# Patient Record
Sex: FEMALE | Race: WHITE | NOT HISPANIC OR LATINO | Employment: UNEMPLOYED | URBAN - NONMETROPOLITAN AREA
[De-identification: names, ages, dates, MRNs, and addresses within clinical notes are randomized per-mention and may not be internally consistent; named-entity substitution may affect disease eponyms.]

---

## 2019-01-29 ENCOUNTER — CLINICAL SUPPORT (OUTPATIENT)
Dept: FAMILY MEDICINE CLINIC | Facility: CLINIC | Age: 49
End: 2019-01-29

## 2019-01-29 PROCEDURE — 90715 TDAP VACCINE 7 YRS/> IM: CPT | Performed by: FAMILY MEDICINE

## 2019-01-29 PROCEDURE — 90471 IMMUNIZATION ADMIN: CPT | Performed by: FAMILY MEDICINE

## 2019-11-07 ENCOUNTER — CLINICAL SUPPORT (OUTPATIENT)
Dept: FAMILY MEDICINE CLINIC | Facility: CLINIC | Age: 49
End: 2019-11-07

## 2019-11-07 DIAGNOSIS — J20.9 ACUTE BRONCHITIS, UNSPECIFIED ORGANISM: Primary | ICD-10-CM

## 2019-11-07 DIAGNOSIS — J40 BRONCHITIS: ICD-10-CM

## 2019-11-07 PROCEDURE — 96372 THER/PROPH/DIAG INJ SC/IM: CPT | Performed by: FAMILY MEDICINE

## 2019-11-07 RX ORDER — TRIAMCINOLONE ACETONIDE 40 MG/ML
80 INJECTION, SUSPENSION INTRA-ARTICULAR; INTRAMUSCULAR ONCE
Status: COMPLETED | OUTPATIENT
Start: 2019-11-07 | End: 2019-11-07

## 2019-11-07 RX ADMIN — TRIAMCINOLONE ACETONIDE 80 MG: 40 INJECTION, SUSPENSION INTRA-ARTICULAR; INTRAMUSCULAR at 11:29

## 2020-06-25 ENCOUNTER — TELEMEDICINE - AUDIO (OUTPATIENT)
Dept: FAMILY MEDICINE CLINIC | Facility: CLINIC | Age: 50
End: 2020-06-25

## 2020-06-25 DIAGNOSIS — G44.89 OTHER HEADACHE SYNDROME: ICD-10-CM

## 2020-06-25 DIAGNOSIS — R53.81 MALAISE: Primary | ICD-10-CM

## 2020-06-25 PROCEDURE — 99213 OFFICE O/P EST LOW 20 MIN: CPT | Performed by: FAMILY MEDICINE

## 2020-06-25 RX ORDER — IBUPROFEN 600 MG/1
600 TABLET ORAL EVERY 8 HOURS PRN
Qty: 90 TABLET | Refills: 0 | Status: SHIPPED | OUTPATIENT
Start: 2020-06-25 | End: 2020-06-29

## 2020-06-25 RX ORDER — BUTALBITAL, ACETAMINOPHEN AND CAFFEINE 50; 325; 40 MG/1; MG/1; MG/1
1 TABLET ORAL EVERY 4 HOURS PRN
Qty: 30 TABLET | Refills: 0 | Status: SHIPPED | OUTPATIENT
Start: 2020-06-25 | End: 2020-06-29

## 2020-06-25 RX ORDER — SULFAMETHOXAZOLE AND TRIMETHOPRIM 800; 160 MG/1; MG/1
1 TABLET ORAL 2 TIMES DAILY
Qty: 20 TABLET | Refills: 0 | Status: SHIPPED | OUTPATIENT
Start: 2020-06-25 | End: 2020-06-29

## 2020-06-25 NOTE — PROGRESS NOTES
Subjective   Sid Cm is a 50 y.o. female.     History of Present Illness     Video visit changed to phone visit per patient  Cc feels bad.    Sick since Tuesday.  Has been in bed since Tuesday.  Worst headache of her life.  Usually drinks lots of diet coke, changed when sick to gatorade.  She says headache started before switching from diet coke to gatorade.   Also  She thinks she has a urinary tract infection.  When she gets up to urinate, she cant make it to the bathroom in time, she will dribble.   She denies she is dehydrated.  She tried a muscle relaxer last night and no help.   Taking tylenol not helping.       Review of Systems   Constitutional: Positive for fatigue.   Genitourinary: Positive for urgency.   Neurological: Positive for headaches.           There were no vitals taken for this visit.      Objective     Physical Exam        PAST MEDICAL HISTORY     Past Medical History:   Diagnosis Date   • Bronchitis    • Carpal tunnel syndrome    • Forearm pain    • Joint pain of ankle and foot    • Leg pain    • Malaise and fatigue    • Voice hoarseness       PAST SURGICAL HISTORY   No past surgical history on file.   SOCIAL HISTORY     Social History     Socioeconomic History   • Marital status:      Spouse name: Not on file   • Number of children: Not on file   • Years of education: Not on file   • Highest education level: Not on file      ALLERGIES   Patient has no allergy information on record.   MEDICATIONS     Current Outpatient Medications   Medication Sig Dispense Refill   • butalbital-acetaminophen-caffeine (Esgic) -40 MG per tablet Take 1 tablet by mouth Every 4 (Four) Hours As Needed for Headache. Take infrequently, too often can cause headaches. 30 tablet 0   • ibuprofen (ADVIL,MOTRIN) 600 MG tablet Take 1 tablet by mouth Every 8 (Eight) Hours As Needed for Mild Pain . 90 tablet 0   • sulfamethoxazole-trimethoprim (BACTRIM DS,SEPTRA DS) 800-160 MG per tablet Take 1 tablet by  mouth 2 (Two) Times a Day. Avoid sun exposure. Drink plenty of water. 20 tablet 0   • triamcinolone (KENALOG) 0.5 % ointment APPLY TO ITCHY RASH BID-TID PRN       No current facility-administered medications for this visit.         The following portions of the patient's history were reviewed and updated as appropriate: allergies, current medications, past family history, past medical history, past social history, past surgical history and problem list.        Assessment/Plan   Diagnoses and all orders for this visit:    Malaise    Other headache syndrome  -     butalbital-acetaminophen-caffeine (Esgic) -40 MG per tablet; Take 1 tablet by mouth Every 4 (Four) Hours As Needed for Headache. Take infrequently, too often can cause headaches.    Other orders  -     sulfamethoxazole-trimethoprim (BACTRIM DS,SEPTRA DS) 800-160 MG per tablet; Take 1 tablet by mouth 2 (Two) Times a Day. Avoid sun exposure. Drink plenty of water.  -     ibuprofen (ADVIL,MOTRIN) 600 MG tablet; Take 1 tablet by mouth Every 8 (Eight) Hours As Needed for Mild Pain .      Suspect viral illness, which caused a headache.  Switching from diet coke to gatorade made her headache worse, caffeine withdrawal ha.  Also, drinking all the gatorade may be causing bladder spasms.  Will cover for uti with bactrim, I told her to drink more water than gatorade.                   No follow-ups on file.                  This document has been electronically signed by Gage Andrews MD on June 25, 2020 15:11

## 2020-06-29 ENCOUNTER — APPOINTMENT (OUTPATIENT)
Dept: ULTRASOUND IMAGING | Facility: HOSPITAL | Age: 50
End: 2020-06-29

## 2020-06-29 ENCOUNTER — APPOINTMENT (OUTPATIENT)
Dept: INTERVENTIONAL RADIOLOGY/VASCULAR | Facility: HOSPITAL | Age: 50
End: 2020-06-29

## 2020-06-29 ENCOUNTER — APPOINTMENT (OUTPATIENT)
Dept: CT IMAGING | Facility: HOSPITAL | Age: 50
End: 2020-06-29

## 2020-06-29 ENCOUNTER — APPOINTMENT (OUTPATIENT)
Dept: GENERAL RADIOLOGY | Facility: HOSPITAL | Age: 50
End: 2020-06-29

## 2020-06-29 ENCOUNTER — HOSPITAL ENCOUNTER (INPATIENT)
Facility: HOSPITAL | Age: 50
LOS: 2 days | Discharge: HOME | End: 2020-07-01
Attending: EMERGENCY MEDICINE | Admitting: INTERNAL MEDICINE

## 2020-06-29 DIAGNOSIS — R65.20 SEPSIS WITH ACUTE RENAL FAILURE WITHOUT SEPTIC SHOCK, DUE TO UNSPECIFIED ORGANISM, UNSPECIFIED ACUTE RENAL FAILURE TYPE (HCC): Primary | ICD-10-CM

## 2020-06-29 DIAGNOSIS — N17.9 SEPSIS WITH ACUTE RENAL FAILURE WITHOUT SEPTIC SHOCK, DUE TO UNSPECIFIED ORGANISM, UNSPECIFIED ACUTE RENAL FAILURE TYPE (HCC): Primary | ICD-10-CM

## 2020-06-29 DIAGNOSIS — E87.1 HYPONATREMIA: ICD-10-CM

## 2020-06-29 DIAGNOSIS — D69.6 THROMBOCYTOPENIA (HCC): ICD-10-CM

## 2020-06-29 DIAGNOSIS — R79.89 ABNORMAL LFTS: ICD-10-CM

## 2020-06-29 DIAGNOSIS — A41.9 SEPSIS WITH ACUTE RENAL FAILURE WITHOUT SEPTIC SHOCK, DUE TO UNSPECIFIED ORGANISM, UNSPECIFIED ACUTE RENAL FAILURE TYPE (HCC): Primary | ICD-10-CM

## 2020-06-29 DIAGNOSIS — D72.810 LYMPHOPENIA: ICD-10-CM

## 2020-06-29 LAB
ALBUMIN SERPL-MCNC: 3.1 G/DL (ref 3.5–5.2)
ALBUMIN/GLOB SERPL: 1 G/DL
ALP SERPL-CCNC: 212 U/L (ref 39–117)
ALT SERPL W P-5'-P-CCNC: 159 U/L (ref 1–33)
AMORPH URATE CRY URNS QL MICRO: ABNORMAL /HPF
ANION GAP SERPL CALCULATED.3IONS-SCNC: 16 MMOL/L (ref 5–15)
APTT PPP: 40.5 SECONDS (ref 20–40.3)
AST SERPL-CCNC: 299 U/L (ref 1–32)
BACTERIA UR QL AUTO: ABNORMAL /HPF
BILIRUB SERPL-MCNC: 1.6 MG/DL (ref 0.2–1.2)
BILIRUB UR QL STRIP: ABNORMAL
BUN SERPL-MCNC: 65 MG/DL (ref 6–20)
BUN/CREAT SERPL: 12.2 (ref 7–25)
CALCIUM SPEC-SCNC: 7.9 MG/DL (ref 8.6–10.5)
CHLORIDE SERPL-SCNC: 96 MMOL/L (ref 98–107)
CLARITY UR: ABNORMAL
CO2 SERPL-SCNC: 17 MMOL/L (ref 22–29)
COLOR UR: ABNORMAL
CREAT SERPL-MCNC: 5.32 MG/DL (ref 0.57–1)
D-LACTATE SERPL-SCNC: 1.1 MMOL/L (ref 0.5–2)
DACRYOCYTES BLD QL SMEAR: ABNORMAL
DEPRECATED RDW RBC AUTO: 43.6 FL (ref 37–54)
ERYTHROCYTE [DISTWIDTH] IN BLOOD BY AUTOMATED COUNT: 14.8 % (ref 12.3–15.4)
GFR SERPL CREATININE-BSD FRML MDRD: 9 ML/MIN/1.73
GFR SERPL CREATININE-BSD FRML MDRD: ABNORMAL ML/MIN/{1.73_M2}
GLOBULIN UR ELPH-MCNC: 3 GM/DL
GLUCOSE SERPL-MCNC: 88 MG/DL (ref 65–99)
GLUCOSE UR STRIP-MCNC: NEGATIVE MG/DL
HAV IGM SERPL QL IA: NORMAL
HBV CORE IGM SERPL QL IA: NORMAL
HBV SURFACE AG SERPL QL IA: NORMAL
HCG SERPL QL: NEGATIVE
HCT VFR BLD AUTO: 36.6 % (ref 34–46.6)
HCV AB SER DONR QL: NORMAL
HGB BLD-MCNC: 13.2 G/DL (ref 12–15.9)
HGB UR QL STRIP.AUTO: ABNORMAL
HOLD SPECIMEN: NORMAL
HOLD SPECIMEN: NORMAL
HYALINE CASTS UR QL AUTO: ABNORMAL /LPF
HYPOCHROMIA BLD QL: ABNORMAL
INR PPP: 0.96 (ref 0.8–1.2)
KETONES UR QL STRIP: NEGATIVE
LEUKOCYTE ESTERASE UR QL STRIP.AUTO: NEGATIVE
LIPASE SERPL-CCNC: 22 U/L (ref 13–60)
LYMPHOCYTES # BLD MANUAL: 0.27 10*3/MM3 (ref 0.7–3.1)
LYMPHOCYTES NFR BLD MANUAL: 6 % (ref 5–12)
LYMPHOCYTES NFR BLD MANUAL: 9 % (ref 19.6–45.3)
MCH RBC QN AUTO: 29.3 PG (ref 26.6–33)
MCHC RBC AUTO-ENTMCNC: 36.1 G/DL (ref 31.5–35.7)
MCV RBC AUTO: 81.2 FL (ref 79–97)
MONOCYTES # BLD AUTO: 0.18 10*3/MM3 (ref 0.1–0.9)
NEUTROPHILS # BLD AUTO: 2.53 10*3/MM3 (ref 1.7–7)
NEUTROPHILS NFR BLD MANUAL: 66 % (ref 42.7–76)
NEUTS BAND NFR BLD MANUAL: 17 % (ref 0–5)
NITRITE UR QL STRIP: NEGATIVE
PH UR STRIP.AUTO: <=5 [PH] (ref 5–9)
PLATELET # BLD AUTO: 50 10*3/MM3 (ref 140–450)
PMV BLD AUTO: 11.2 FL (ref 6–12)
POLYCHROMASIA BLD QL SMEAR: ABNORMAL
POTASSIUM SERPL-SCNC: 3.9 MMOL/L (ref 3.5–5.2)
PROT SERPL-MCNC: 6.1 G/DL (ref 6–8.5)
PROT UR QL STRIP: ABNORMAL
PROTHROMBIN TIME: 12.6 SECONDS (ref 11.1–15.3)
RBC # BLD AUTO: 4.51 10*6/MM3 (ref 3.77–5.28)
RBC # UR: ABNORMAL /HPF
REF LAB TEST METHOD: ABNORMAL
SARS-COV-2 N GENE RESP QL NAA+PROBE: NOT DETECTED
SMALL PLATELETS BLD QL SMEAR: ABNORMAL
SODIUM SERPL-SCNC: 129 MMOL/L (ref 136–145)
SP GR UR STRIP: 1.02 (ref 1–1.03)
SQUAMOUS #/AREA URNS HPF: ABNORMAL /HPF
UROBILINOGEN UR QL STRIP: ABNORMAL
VARIANT LYMPHS NFR BLD MANUAL: 2 % (ref 0–5)
WBC # BLD AUTO: 3.05 10*3/MM3 (ref 3.4–10.8)
WBC MORPH BLD: NORMAL
WBC UR QL AUTO: ABNORMAL /HPF
WHOLE BLOOD HOLD SPECIMEN: NORMAL
WHOLE BLOOD HOLD SPECIMEN: NORMAL

## 2020-06-29 PROCEDURE — 85610 PROTHROMBIN TIME: CPT | Performed by: EMERGENCY MEDICINE

## 2020-06-29 PROCEDURE — 87040 BLOOD CULTURE FOR BACTERIA: CPT | Performed by: INTERNAL MEDICINE

## 2020-06-29 PROCEDURE — 25010000002 PIPERACILLIN SOD-TAZOBACTAM PER 1 G: Performed by: EMERGENCY MEDICINE

## 2020-06-29 PROCEDURE — 76937 US GUIDE VASCULAR ACCESS: CPT

## 2020-06-29 PROCEDURE — 85007 BL SMEAR W/DIFF WBC COUNT: CPT | Performed by: EMERGENCY MEDICINE

## 2020-06-29 PROCEDURE — 36410 VNPNXR 3YR/> PHY/QHP DX/THER: CPT

## 2020-06-29 PROCEDURE — 80074 ACUTE HEPATITIS PANEL: CPT | Performed by: EMERGENCY MEDICINE

## 2020-06-29 PROCEDURE — 99285 EMERGENCY DEPT VISIT HI MDM: CPT

## 2020-06-29 PROCEDURE — 87150 DNA/RNA AMPLIFIED PROBE: CPT | Performed by: EMERGENCY MEDICINE

## 2020-06-29 PROCEDURE — 51702 INSERT TEMP BLADDER CATH: CPT

## 2020-06-29 PROCEDURE — 87635 SARS-COV-2 COVID-19 AMP PRB: CPT | Performed by: EMERGENCY MEDICINE

## 2020-06-29 PROCEDURE — 74176 CT ABD & PELVIS W/O CONTRAST: CPT

## 2020-06-29 PROCEDURE — 85025 COMPLETE CBC W/AUTO DIFF WBC: CPT | Performed by: EMERGENCY MEDICINE

## 2020-06-29 PROCEDURE — C1751 CATH, INF, PER/CENT/MIDLINE: HCPCS

## 2020-06-29 PROCEDURE — 86757 RICKETTSIA ANTIBODY: CPT | Performed by: EMERGENCY MEDICINE

## 2020-06-29 PROCEDURE — 70450 CT HEAD/BRAIN W/O DYE: CPT

## 2020-06-29 PROCEDURE — 05HY33Z INSERTION OF INFUSION DEVICE INTO UPPER VEIN, PERCUTANEOUS APPROACH: ICD-10-PCS | Performed by: INTERNAL MEDICINE

## 2020-06-29 PROCEDURE — 93010 ELECTROCARDIOGRAM REPORT: CPT | Performed by: INTERNAL MEDICINE

## 2020-06-29 PROCEDURE — 85730 THROMBOPLASTIN TIME PARTIAL: CPT | Performed by: EMERGENCY MEDICINE

## 2020-06-29 PROCEDURE — 86618 LYME DISEASE ANTIBODY: CPT | Performed by: EMERGENCY MEDICINE

## 2020-06-29 PROCEDURE — 85060 BLOOD SMEAR INTERPRETATION: CPT | Performed by: EMERGENCY MEDICINE

## 2020-06-29 PROCEDURE — 84703 CHORIONIC GONADOTROPIN ASSAY: CPT | Performed by: EMERGENCY MEDICINE

## 2020-06-29 PROCEDURE — 81001 URINALYSIS AUTO W/SCOPE: CPT | Performed by: EMERGENCY MEDICINE

## 2020-06-29 PROCEDURE — 80053 COMPREHEN METABOLIC PANEL: CPT

## 2020-06-29 PROCEDURE — 36415 COLL VENOUS BLD VENIPUNCTURE: CPT | Performed by: INTERNAL MEDICINE

## 2020-06-29 PROCEDURE — 86666 EHRLICHIA ANTIBODY: CPT | Performed by: EMERGENCY MEDICINE

## 2020-06-29 PROCEDURE — 93005 ELECTROCARDIOGRAM TRACING: CPT | Performed by: EMERGENCY MEDICINE

## 2020-06-29 PROCEDURE — 87086 URINE CULTURE/COLONY COUNT: CPT | Performed by: INTERNAL MEDICINE

## 2020-06-29 PROCEDURE — 87040 BLOOD CULTURE FOR BACTERIA: CPT | Performed by: EMERGENCY MEDICINE

## 2020-06-29 PROCEDURE — 86753 PROTOZOA ANTIBODY NOS: CPT | Performed by: EMERGENCY MEDICINE

## 2020-06-29 PROCEDURE — 83605 ASSAY OF LACTIC ACID: CPT | Performed by: EMERGENCY MEDICINE

## 2020-06-29 PROCEDURE — 25010000002 ONDANSETRON PER 1 MG: Performed by: EMERGENCY MEDICINE

## 2020-06-29 PROCEDURE — 83690 ASSAY OF LIPASE: CPT | Performed by: EMERGENCY MEDICINE

## 2020-06-29 PROCEDURE — 71045 X-RAY EXAM CHEST 1 VIEW: CPT

## 2020-06-29 RX ORDER — FOLIC ACID 1 MG/1
1 TABLET ORAL DAILY
COMMUNITY
End: 2022-08-12

## 2020-06-29 RX ORDER — DOXYCYCLINE 100 MG/1
100 CAPSULE ORAL 2 TIMES DAILY
Status: ON HOLD | COMMUNITY
Start: 2020-06-28 | End: 2020-06-29

## 2020-06-29 RX ORDER — ACETAMINOPHEN 325 MG/1
650 TABLET ORAL ONCE
Status: COMPLETED | OUTPATIENT
Start: 2020-06-29 | End: 2020-06-29

## 2020-06-29 RX ORDER — SODIUM CHLORIDE 0.9 % (FLUSH) 0.9 %
10 SYRINGE (ML) INJECTION EVERY 12 HOURS SCHEDULED
Status: DISCONTINUED | OUTPATIENT
Start: 2020-06-29 | End: 2020-07-01 | Stop reason: HOSPADM

## 2020-06-29 RX ORDER — SODIUM CHLORIDE 0.9 % (FLUSH) 0.9 %
10 SYRINGE (ML) INJECTION AS NEEDED
Status: DISCONTINUED | OUTPATIENT
Start: 2020-06-29 | End: 2020-07-01 | Stop reason: HOSPADM

## 2020-06-29 RX ORDER — SODIUM CHLORIDE 9 MG/ML
150 INJECTION, SOLUTION INTRAVENOUS CONTINUOUS
Status: DISCONTINUED | OUTPATIENT
Start: 2020-06-29 | End: 2020-06-30

## 2020-06-29 RX ORDER — ONDANSETRON 2 MG/ML
4 INJECTION INTRAMUSCULAR; INTRAVENOUS ONCE
Status: COMPLETED | OUTPATIENT
Start: 2020-06-29 | End: 2020-06-29

## 2020-06-29 RX ORDER — LEVOTHYROXINE SODIUM 88 UG/1
88 TABLET ORAL
Status: DISCONTINUED | OUTPATIENT
Start: 2020-06-30 | End: 2020-07-01 | Stop reason: HOSPADM

## 2020-06-29 RX ORDER — SULFASALAZINE 500 MG/1
500 TABLET ORAL 3 TIMES DAILY
COMMUNITY
End: 2020-07-01 | Stop reason: HOSPADM

## 2020-06-29 RX ORDER — SODIUM CHLORIDE 9 MG/ML
125 INJECTION, SOLUTION INTRAVENOUS CONTINUOUS
Status: DISCONTINUED | OUTPATIENT
Start: 2020-06-29 | End: 2020-06-29

## 2020-06-29 RX ORDER — LEVOTHYROXINE SODIUM 88 UG/1
1 TABLET ORAL
Status: ON HOLD | COMMUNITY
Start: 2020-04-06 | End: 2020-06-29

## 2020-06-29 RX ORDER — SULFASALAZINE 500 MG/1
500 TABLET ORAL 3 TIMES DAILY
Status: ON HOLD | COMMUNITY
Start: 2019-04-08 | End: 2020-06-29

## 2020-06-29 RX ORDER — ONDANSETRON 4 MG/1
4 TABLET, ORALLY DISINTEGRATING ORAL EVERY 8 HOURS PRN
Status: ON HOLD | COMMUNITY
Start: 2020-06-28 | End: 2020-06-29

## 2020-06-29 RX ORDER — PREDNISONE 1 MG/1
5 TABLET ORAL EVERY OTHER DAY
COMMUNITY
End: 2022-08-23

## 2020-06-29 RX ORDER — PREDNISONE 1 MG/1
5 TABLET ORAL EVERY OTHER DAY
Status: ON HOLD | COMMUNITY
Start: 2019-04-08 | End: 2020-06-29

## 2020-06-29 RX ORDER — LEVOTHYROXINE SODIUM 88 UG/1
88 TABLET ORAL DAILY
COMMUNITY

## 2020-06-29 RX ADMIN — ACETAMINOPHEN 650 MG: 325 TABLET, FILM COATED ORAL at 17:01

## 2020-06-29 RX ADMIN — SODIUM CHLORIDE 1641 ML: 900 INJECTION, SOLUTION INTRAVENOUS at 16:04

## 2020-06-29 RX ADMIN — SODIUM CHLORIDE 250 ML/HR: 900 INJECTION, SOLUTION INTRAVENOUS at 20:23

## 2020-06-29 RX ADMIN — ONDANSETRON 4 MG: 2 INJECTION INTRAMUSCULAR; INTRAVENOUS at 17:01

## 2020-06-29 RX ADMIN — Medication 10 ML: at 14:30

## 2020-06-29 RX ADMIN — VANCOMYCIN HYDROCHLORIDE 2500 MG: 1 INJECTION, POWDER, LYOPHILIZED, FOR SOLUTION INTRAVENOUS at 21:08

## 2020-06-30 LAB
ALBUMIN SERPL-MCNC: 2.6 G/DL (ref 3.5–5.2)
ALBUMIN/GLOB SERPL: 1 G/DL
ALP SERPL-CCNC: 185 U/L (ref 39–117)
ALT SERPL W P-5'-P-CCNC: 134 U/L (ref 1–33)
ANION GAP SERPL CALCULATED.3IONS-SCNC: 11 MMOL/L (ref 5–15)
AST SERPL-CCNC: 240 U/L (ref 1–32)
BILIRUB SERPL-MCNC: 1.3 MG/DL (ref 0.2–1.2)
BUN SERPL-MCNC: 43 MG/DL (ref 6–20)
BUN/CREAT SERPL: 17.7 (ref 7–25)
CALCIUM SPEC-SCNC: 7.4 MG/DL (ref 8.6–10.5)
CHLORIDE SERPL-SCNC: 106 MMOL/L (ref 98–107)
CO2 SERPL-SCNC: 16 MMOL/L (ref 22–29)
CREAT SERPL-MCNC: 2.43 MG/DL (ref 0.57–1)
CREAT UR-MCNC: 76.9 MG/DL
CYTOLOGIST CVX/VAG CYTO: NORMAL
DEPRECATED RDW RBC AUTO: 45.6 FL (ref 37–54)
ERYTHROCYTE [DISTWIDTH] IN BLOOD BY AUTOMATED COUNT: 15 % (ref 12.3–15.4)
GFR SERPL CREATININE-BSD FRML MDRD: 21 ML/MIN/1.73
GLOBULIN UR ELPH-MCNC: 2.5 GM/DL
GLUCOSE BLDC GLUCOMTR-MCNC: 85 MG/DL (ref 70–130)
GLUCOSE SERPL-MCNC: 79 MG/DL (ref 65–99)
HCT VFR BLD AUTO: 35.7 % (ref 34–46.6)
HGB BLD-MCNC: 12.3 G/DL (ref 12–15.9)
MCH RBC QN AUTO: 28.6 PG (ref 26.6–33)
MCHC RBC AUTO-ENTMCNC: 34.5 G/DL (ref 31.5–35.7)
MCV RBC AUTO: 83 FL (ref 79–97)
PATH INTERP BLD-IMP: NORMAL
PLATELET # BLD AUTO: 57 10*3/MM3 (ref 140–450)
PMV BLD AUTO: 11.8 FL (ref 6–12)
POTASSIUM SERPL-SCNC: 4 MMOL/L (ref 3.5–5.2)
PROT SERPL-MCNC: 5.1 G/DL (ref 6–8.5)
RBC # BLD AUTO: 4.3 10*6/MM3 (ref 3.77–5.28)
SODIUM SERPL-SCNC: 133 MMOL/L (ref 136–145)
SODIUM UR-SCNC: 50 MMOL/L
WBC # BLD AUTO: 3.07 10*3/MM3 (ref 3.4–10.8)

## 2020-06-30 PROCEDURE — 25010000002 ONDANSETRON PER 1 MG: Performed by: INTERNAL MEDICINE

## 2020-06-30 PROCEDURE — 80053 COMPREHEN METABOLIC PANEL: CPT | Performed by: INTERNAL MEDICINE

## 2020-06-30 PROCEDURE — 84300 ASSAY OF URINE SODIUM: CPT | Performed by: INTERNAL MEDICINE

## 2020-06-30 PROCEDURE — 25010000002 PIPERACILLIN SOD-TAZOBACTAM PER 1 G: Performed by: INTERNAL MEDICINE

## 2020-06-30 PROCEDURE — 87205 SMEAR GRAM STAIN: CPT | Performed by: INTERNAL MEDICINE

## 2020-06-30 PROCEDURE — 85027 COMPLETE CBC AUTOMATED: CPT | Performed by: INTERNAL MEDICINE

## 2020-06-30 PROCEDURE — 82570 ASSAY OF URINE CREATININE: CPT | Performed by: INTERNAL MEDICINE

## 2020-06-30 PROCEDURE — 82962 GLUCOSE BLOOD TEST: CPT

## 2020-06-30 RX ORDER — ONDANSETRON 2 MG/ML
4 INJECTION INTRAMUSCULAR; INTRAVENOUS EVERY 6 HOURS PRN
Status: DISCONTINUED | OUTPATIENT
Start: 2020-06-30 | End: 2020-07-01 | Stop reason: HOSPADM

## 2020-06-30 RX ADMIN — PIPERACILLIN SODIUM AND TAZOBACTAM SODIUM 3.38 G: 3; .375 INJECTION, POWDER, LYOPHILIZED, FOR SOLUTION INTRAVENOUS at 05:39

## 2020-06-30 RX ADMIN — LEVOTHYROXINE SODIUM 88 MCG: 88 TABLET ORAL at 08:57

## 2020-06-30 RX ADMIN — SODIUM BICARBONATE 75 MEQ: 84 INJECTION, SOLUTION INTRAVENOUS at 19:40

## 2020-06-30 RX ADMIN — PIPERACILLIN SODIUM AND TAZOBACTAM SODIUM 3.38 G: 3; .375 INJECTION, POWDER, LYOPHILIZED, FOR SOLUTION INTRAVENOUS at 18:26

## 2020-06-30 RX ADMIN — SODIUM CHLORIDE, PRESERVATIVE FREE 10 ML: 5 INJECTION INTRAVENOUS at 10:48

## 2020-06-30 RX ADMIN — SODIUM CHLORIDE, PRESERVATIVE FREE 10 ML: 5 INJECTION INTRAVENOUS at 20:53

## 2020-06-30 RX ADMIN — SODIUM CHLORIDE 250 ML/HR: 900 INJECTION, SOLUTION INTRAVENOUS at 05:38

## 2020-06-30 RX ADMIN — SODIUM CHLORIDE 250 ML/HR: 900 INJECTION, SOLUTION INTRAVENOUS at 00:01

## 2020-06-30 RX ADMIN — ONDANSETRON 4 MG: 2 INJECTION INTRAMUSCULAR; INTRAVENOUS at 21:14

## 2020-07-01 ENCOUNTER — READMISSION MANAGEMENT (OUTPATIENT)
Dept: CALL CENTER | Facility: HOSPITAL | Age: 50
End: 2020-07-01

## 2020-07-01 VITALS
HEART RATE: 88 BPM | HEIGHT: 64 IN | WEIGHT: 277.4 LBS | RESPIRATION RATE: 18 BRPM | TEMPERATURE: 96.6 F | OXYGEN SATURATION: 94 % | SYSTOLIC BLOOD PRESSURE: 105 MMHG | BODY MASS INDEX: 47.36 KG/M2 | DIASTOLIC BLOOD PRESSURE: 58 MMHG

## 2020-07-01 LAB
ALBUMIN SERPL-MCNC: 2.7 G/DL (ref 3.5–5.2)
ALBUMIN/GLOB SERPL: 1 G/DL
ALP SERPL-CCNC: 204 U/L (ref 39–117)
ALT SERPL W P-5'-P-CCNC: 109 U/L (ref 1–33)
ANION GAP SERPL CALCULATED.3IONS-SCNC: 10 MMOL/L (ref 5–15)
ANISOCYTOSIS BLD QL: ABNORMAL
AST SERPL-CCNC: 168 U/L (ref 1–32)
B BURGDOR IGG+IGM SER-ACNC: <0.91 ISR (ref 0–0.9)
B BURGDOR IGM SER-ACNC: <0.8 INDEX (ref 0–0.79)
BACTERIA BLD CULT: NORMAL
BACTERIA SPEC AEROBE CULT: NO GROWTH
BILIRUB SERPL-MCNC: 1 MG/DL (ref 0.2–1.2)
BUN SERPL-MCNC: 15 MG/DL (ref 6–20)
BUN/CREAT SERPL: 14.9 (ref 7–25)
CALCIUM SPEC-SCNC: 7.8 MG/DL (ref 8.6–10.5)
CHLORIDE SERPL-SCNC: 100 MMOL/L (ref 98–107)
CO2 SERPL-SCNC: 24 MMOL/L (ref 22–29)
CREAT SERPL-MCNC: 1.01 MG/DL (ref 0.57–1)
DEPRECATED RDW RBC AUTO: 43.7 FL (ref 37–54)
EOSINOPHIL # BLD MANUAL: 0.06 10*3/MM3 (ref 0–0.4)
EOSINOPHIL NFR BLD MANUAL: 2 % (ref 0.3–6.2)
ERYTHROCYTE [DISTWIDTH] IN BLOOD BY AUTOMATED COUNT: 14.7 % (ref 12.3–15.4)
GFR SERPL CREATININE-BSD FRML MDRD: 58 ML/MIN/1.73
GLOBULIN UR ELPH-MCNC: 2.6 GM/DL
GLUCOSE SERPL-MCNC: 87 MG/DL (ref 65–99)
HANSEL STAIN: NEGATIVE
HCT VFR BLD AUTO: 36.2 % (ref 34–46.6)
HGB BLD-MCNC: 12.6 G/DL (ref 12–15.9)
LYMPHOCYTES # BLD MANUAL: 0.45 10*3/MM3 (ref 0.7–3.1)
LYMPHOCYTES NFR BLD MANUAL: 14 % (ref 19.6–45.3)
LYMPHOCYTES NFR BLD MANUAL: 9 % (ref 5–12)
MCH RBC QN AUTO: 28.5 PG (ref 26.6–33)
MCHC RBC AUTO-ENTMCNC: 34.8 G/DL (ref 31.5–35.7)
MCV RBC AUTO: 81.9 FL (ref 79–97)
MONOCYTES # BLD AUTO: 0.29 10*3/MM3 (ref 0.1–0.9)
NEUTROPHILS # BLD AUTO: 2.37 10*3/MM3 (ref 1.7–7)
NEUTROPHILS NFR BLD MANUAL: 53 % (ref 42.7–76)
NEUTS BAND NFR BLD MANUAL: 21 % (ref 0–5)
PLATELET # BLD AUTO: 76 10*3/MM3 (ref 140–450)
PMV BLD AUTO: 12.2 FL (ref 6–12)
POTASSIUM SERPL-SCNC: 3.7 MMOL/L (ref 3.5–5.2)
PROT SERPL-MCNC: 5.3 G/DL (ref 6–8.5)
RBC # BLD AUTO: 4.42 10*6/MM3 (ref 3.77–5.28)
SMALL PLATELETS BLD QL SMEAR: ABNORMAL
SODIUM SERPL-SCNC: 134 MMOL/L (ref 136–145)
VARIANT LYMPHS NFR BLD MANUAL: 1 % (ref 0–5)
WBC # BLD AUTO: 3.2 10*3/MM3 (ref 3.4–10.8)
WBC MORPH BLD: NORMAL

## 2020-07-01 PROCEDURE — 85007 BL SMEAR W/DIFF WBC COUNT: CPT | Performed by: INTERNAL MEDICINE

## 2020-07-01 PROCEDURE — 85025 COMPLETE CBC W/AUTO DIFF WBC: CPT | Performed by: INTERNAL MEDICINE

## 2020-07-01 PROCEDURE — 80053 COMPREHEN METABOLIC PANEL: CPT | Performed by: INTERNAL MEDICINE

## 2020-07-01 PROCEDURE — 25010000002 PIPERACILLIN SOD-TAZOBACTAM PER 1 G: Performed by: INTERNAL MEDICINE

## 2020-07-01 RX ADMIN — SODIUM BICARBONATE 75 MEQ: 84 INJECTION, SOLUTION INTRAVENOUS at 02:43

## 2020-07-01 RX ADMIN — PIPERACILLIN SODIUM AND TAZOBACTAM SODIUM 3.38 G: 3; .375 INJECTION, POWDER, LYOPHILIZED, FOR SOLUTION INTRAVENOUS at 06:40

## 2020-07-01 RX ADMIN — LEVOTHYROXINE SODIUM 88 MCG: 88 TABLET ORAL at 06:43

## 2020-07-01 NOTE — PLAN OF CARE
Problem: Patient Care Overview  Goal: Plan of Care Review  Outcome: Ongoing (interventions implemented as appropriate)  Flowsheets (Taken 7/1/2020 8002)  Progress: no change  Plan of Care Reviewed With: patient  Outcome Summary: initial assessment; VSS; no complaints throughout the night; will continue to monitor

## 2020-07-01 NOTE — OUTREACH NOTE
Prep Survey      Responses   [facility] facility patient discharged from?  [place]   Is LACE score < 7 ?  No   Eligibility  [facility]   Date of Admission  06/29/20   Date of Discharge  07/01/20   Discharge Disposition  Home or Self Care   Discharge diagnosis  sepsis   COVID-19 Test Status  Negative   Does the patient have one of the following disease processes/diagnoses(primary or secondary)?  Sepsis   Does the patient have Home health ordered?  No   Is there a DME ordered?  No   Prep survey completed?  Yes          Milady Barros, RN

## 2020-07-01 NOTE — DISCHARGE SUMMARY
Discharge Summary  Rich Ashford MD  Hospitalist     Date of Discharge:  7/1/2020    Discharge Diagnosis:      Sepsis (CMS/HCC)    Acute renal failure (ARF) (CMS/HCC)    Thrombocytopenia (CMS/[facility])    Elevated LFTs    Hyponatremia      Presenting Problem/History of Present Illness  Generalized weakness     Hospital Course  Patient is a 50 y.o. female admitted for generalized weakness, aches and pains, hypotension, low grade fever and acute renal failure possibly due to sepsis. Her Covid screen test was negative, her blood cultures obtained on admission were negative as well and she recovered rather quickly with the help of aggressive IV hydration, IV antibiotics and supportive care.    Her initial creatinine was 5.32, dropping down to 1.01 on discharge. Her LFTs and platelet count improved as well and are trending down. It is also possible that the renal failure was due at least in part to Bactrim taken in the few days prior to admission in combination with sulfasalazine.    She is afebrile now, her blood pressure is 105/58 with a heart rate of 88/min and an Oxygen saturation of 94% on room air. She is discharged home with the advice to follow up with her family physician in the next week. Some of the serological tests obtained on admission are still pending (RMSF, Ehrlichia, Babesia, Borrelia infectious titers) on the day of discharge.    Consults:   Consults     Date and Time Order Name Status Description    6/29/2020 1817 Inpatient Nephrology Consult Completed         Condition on Discharge:  stable    Vital Signs  Temp:  [96.6 °F (35.9 °C)-98.6 °F (37 °C)] 96.6 °F (35.9 °C)  Heart Rate:  [80-96] 88  Resp:  [18] 18  BP: ()/(49-58) 105/58    Physical Exam:  Physical Exam   Constitutional: She is oriented to person, place, and time. No distress.   Obese    HENT:   Head: Normocephalic and atraumatic.   Eyes: Pupils are equal, round, and reactive to light. EOM are normal. No scleral icterus.   Neck: Normal range  of motion. Neck supple.   Cardiovascular: Normal rate and regular rhythm.   Pulmonary/Chest: Effort normal and breath sounds normal. No stridor. No respiratory distress.   Abdominal: Soft. Bowel sounds are normal. She exhibits no distension.   Musculoskeletal: Normal range of motion. She exhibits no tenderness.   Neurological: She is alert and oriented to person, place, and time. She displays normal reflexes. No cranial nerve deficit. Coordination normal.   Skin: Skin is warm. No rash noted. No erythema. There is pallor.   Psychiatric: She has a normal mood and affect.   Vitals reviewed.      Discharge Disposition  Home or Self Care    Discharge Medications     Discharge Medications      Continue These Medications      Instructions Start Date   folic acid 1 MG tablet  Commonly known as:  FOLVITE   1 mg, Oral, Daily      levothyroxine 88 MCG tablet  Commonly known as:  SYNTHROID, LEVOTHROID   88 mcg, Oral, Daily      predniSONE 5 MG tablet  Commonly known as:  DELTASONE   5 mg, Oral, Every Other Day         Stop These Medications    sulfaSALAzine 500 MG tablet  Commonly known as:  AZULFIDINE            Discharge Diet:   Diet Instructions     Diet: Regular      Discharge Diet:  Regular          Activity at Discharge:   Activity Instructions     Activity as Tolerated            Follow-up Appointments  Future Appointments   Date Time Provider Department Center   7/8/2020  9:30 AM Gage Andrews MD [facility] None     Additional Instructions for the Follow-ups that You Need to Schedule     Discharge Follow-up with PCP   As directed       Currently Documented PCP:    Gage Andrews MD    PCP Phone Number:    [phone]     Follow Up Details:  in 1 week               Test Results Pending at Discharge   Order Current Status    Babesia Microti Antibody Panel In process    Ehrlichia Antibody Panel In process    [place] Spotted Fever, IgM In process    [facility] Spotted Fever, IgG In process    Blood Culture -  Blood, Arm, Right Preliminary result    Blood Culture - Blood, Hand, Left Preliminary result           Rich Ashford MD  07/01/20  20:12

## 2020-07-02 ENCOUNTER — TRANSITIONAL CARE MANAGEMENT TELEPHONE ENCOUNTER (OUTPATIENT)
Dept: CALL CENTER | Facility: HOSPITAL | Age: 50
End: 2020-07-02

## 2020-07-02 LAB
B MICROTI IGG TITR SER: NORMAL {TITER}
B MICROTI IGM TITR SER: NORMAL {TITER}
BACTERIA SPEC AEROBE CULT: ABNORMAL
GRAM STN SPEC: ABNORMAL
ISOLATED FROM: ABNORMAL
R RICKETTSI IGG SER QL IA: NEGATIVE
R RICKETTSI IGM TITR SER: 0.29 INDEX (ref 0–0.89)

## 2020-07-02 NOTE — OUTREACH NOTE
Call Center TCM Note      Responses   [facility] patient discharged from?  [place]   COVID-19 Test Status  Negative   Does the patient have one of the following disease processes/diagnoses(primary or secondary)?  Sepsis   TCM attempt successful?  No   Unsuccessful attempts  Attempt 2          Danielle Frank MA    7/2/2020, 15:21

## 2020-07-02 NOTE — OUTREACH NOTE
Call Center TCM Note      Responses   [facility] patient discharged from?  [place]   COVID-19 Test Status  Negative   Does the patient have one of the following disease processes/diagnoses(primary or secondary)?  Sepsis   TCM attempt successful?  No   Unsuccessful attempts  Attempt 1          Danielle Frank MA    7/2/2020, 13:55

## 2020-07-03 ENCOUNTER — TRANSITIONAL CARE MANAGEMENT TELEPHONE ENCOUNTER (OUTPATIENT)
Dept: CALL CENTER | Facility: HOSPITAL | Age: 50
End: 2020-07-03

## 2020-07-03 NOTE — OUTREACH NOTE
Call Center TCM Note      Responses   [facility]   COVID-19 Test Status  Negative   Does the patient have one of the following disease processes/diagnoses(primary or secondary)?  Sepsis   TCM attempt successful?  Yes   Call start time  0920   Call end time  0930   Discharge diagnosis  sepsis   Is patient permission given to speak with other caregiver?  No   Meds reviewed with patient/caregiver?  Yes   Is the patient having any side effects they believe may be caused by any medication additions or changes?  No   Does the patient have all medications related to this admission filled (includes all antibiotics, inhalers, nebulizers,steroids,etc.)  N/A [No new meds ordered for patient at discharge. ]   Is the patient taking all medications as directed (includes completed medication regime)?  Yes   Does the patient have a primary care provider?   Yes   Comments regarding PCP  Patient reports that her PCP is Dr Javed Thomson with [facility]. She states that she has follow up appt scheduled for 7/8/20 1040am with Dr Thomson. Patient states that Dr Andrews is not her Dr.    Does the patient have an appointment with their PCP within 7 days of discharge?  Yes   Has the patient kept scheduled appointments due by today?  N/A   Has home health visited the patient within 72 hours of discharge?  N/A   Pulse Ox monitoring  None   Psychosocial issues?  No   Did the patient receive a copy of their discharge instructions?  Yes   Nursing interventions  Reviewed instructions with patient   What is the patient's perception of their health status since discharge?  Improving   Is the patient/caregiver able to teach back Sepsis?  S - Shivering,fever or very cold, S - Sleepy, difficult to arouse,confused   Nursing interventions  Nurse provided reassurance to patient, Nurse provided patient education   Is patient/caregiver able to teach back steps to recovery at home?  Set small, achievable  goals for return to baseline health, Rest and regain strength   Is the patient/caregiver able to teach back signs and symptoms of worsening condition:  Fever, Hyperthermia, Rapid heart rate (>90), Shortness of breath/rapid respiratory rate, Altered mental status(confusion/coma)   Is the patient/caregiver able to teach back the hierarchy of who to call/visit for symptoms/problems? PCP, Specialist, Home health nurse, Urgent Care, ED, 911  Yes   Additional teach back comments  Patient states that she will drink plenty of fluids and monitor temperature. Unable to check blood pressure at home.    TCM call completed?  Yes          Danielle Combs, RN    7/3/2020, 09:31

## 2020-07-04 LAB — BACTERIA SPEC AEROBE CULT: NORMAL

## 2020-07-08 ENCOUNTER — READMISSION MANAGEMENT (OUTPATIENT)
Dept: CALL CENTER | Facility: HOSPITAL | Age: 50
End: 2020-07-08

## 2020-07-08 NOTE — OUTREACH NOTE
Sepsis Week 2 Survey      Responses   [facility] patient discharged from?  [place]   COVID-19 Test Status  Negative   Does the patient have one of the following disease processes/diagnoses(primary or secondary)?  Sepsis   Week 2 attempt successful?  No   Unsuccessful attempts  Attempt 1          Ingrid Palacios RN

## 2020-07-09 LAB
A PHAGOCYTOPH IGM TITR SER IF: NEGATIVE {TITER}
CONV HGE IGG TITER: NEGATIVE
E CHAFFEENSIS IGG TITR SER IF: NEGATIVE {TITER}
E. CHAFFEENSIS (HME) IGM TITER: NEGATIVE

## 2020-07-10 ENCOUNTER — READMISSION MANAGEMENT (OUTPATIENT)
Dept: CALL CENTER | Facility: HOSPITAL | Age: 50
End: 2020-07-10

## 2020-07-10 NOTE — OUTREACH NOTE
Sepsis Week 2 Survey      Responses   [facility]   COVID-19 Test Status  Negative   Does the patient have one of the following disease processes/diagnoses(primary or secondary)?  Sepsis   Week 2 attempt successful?  Yes   Call start time  1404   Call end time  1409   Discharge diagnosis  sepsis   Meds reviewed with patient/caregiver?  Yes   Is the patient having any side effects they believe may be caused by any medication additions or changes?  No   Does the patient have all medications related to this admission filled (includes all antibiotics, inhalers, nebulizers,steroids,etc.)  Yes   Is the patient taking all medications as directed (includes completed medication regime)?  Yes   Does the patient have a primary care provider?   Yes   Does the patient have an appointment with their PCP within 7 days of discharge?  Yes   Has the patient kept scheduled appointments due by today?  Yes   Has home health visited the patient within 72 hours of discharge?  N/A   Pulse Ox monitoring  None   Psychosocial issues?  No   Did the patient receive a copy of their discharge instructions?  Yes   Nursing interventions  Reviewed instructions with patient   What is the patient's perception of their health status since discharge?  Improving   Nursing interventions  Nurse provided patient education   Is the patient/caregiver able to teach back Sepsis?  S - Shivering,fever or very cold, S - Short of breath, I -   I feel like I might die-a feeling of hopelessness   Nursing interventions  Nurse provided reassurance to patient   Is patient/caregiver able to teach back steps to recovery at home?  Set small, achievable goals for return to baseline health, Rest and regain strength, Exercise as tolerated, Eat a balanced diet, Make a list of questions for PCP appoinment   Is the patient/caregiver able to teach back signs and symptoms of worsening condition:  Fever, Edema, Shortness of breath/rapid  respiratory rate   Is the patient/caregiver able to teach back the hierarchy of who to call/visit for symptoms/problems? PCP, Specialist, Home health nurse, Urgent Care, ED, 911  Yes   Additional teach back comments  Tick Borne illness, Taking doxycycline, no fever, no SOA, appetite improving.  Encouraged probiotics and anti yeast medicine.   Week 2 call completed?  Yes   Wrap up additional comments  improving.          Ingrid Palacios RN

## 2020-07-16 ENCOUNTER — HOSPITAL ENCOUNTER (INPATIENT)
Facility: HOSPITAL | Age: 50
LOS: 7 days | Discharge: HOME | End: 2020-07-23
Attending: INTERNAL MEDICINE | Admitting: INTERNAL MEDICINE

## 2020-07-16 DIAGNOSIS — I26.99 ACUTE PULMONARY EMBOLISM WITHOUT ACUTE COR PULMONALE, UNSPECIFIED PULMONARY EMBOLISM TYPE (HCC): Primary | ICD-10-CM

## 2020-07-16 LAB
ALBUMIN SERPL-MCNC: 4 G/DL (ref 3.5–5.2)
ALBUMIN/GLOB SERPL: 1.1 G/DL
ALP SERPL-CCNC: 122 U/L (ref 39–117)
ALT SERPL W P-5'-P-CCNC: 17 U/L (ref 1–33)
ANION GAP SERPL CALCULATED.3IONS-SCNC: 12 MMOL/L (ref 5–15)
APTT PPP: 28.8 SECONDS (ref 20–40.3)
AST SERPL-CCNC: 18 U/L (ref 1–32)
BASOPHILS # BLD AUTO: 0.05 10*3/MM3 (ref 0–0.2)
BASOPHILS NFR BLD AUTO: 0.5 % (ref 0–1.5)
BILIRUB SERPL-MCNC: 0.9 MG/DL (ref 0–1.2)
BUN SERPL-MCNC: 10 MG/DL (ref 6–20)
BUN/CREAT SERPL: 13.2 (ref 7–25)
CALCIUM SPEC-SCNC: 9.5 MG/DL (ref 8.6–10.5)
CHLORIDE SERPL-SCNC: 102 MMOL/L (ref 98–107)
CO2 SERPL-SCNC: 22 MMOL/L (ref 22–29)
CREAT SERPL-MCNC: 0.76 MG/DL (ref 0.57–1)
DEPRECATED RDW RBC AUTO: 44.7 FL (ref 37–54)
EOSINOPHIL # BLD AUTO: 0.01 10*3/MM3 (ref 0–0.4)
EOSINOPHIL NFR BLD AUTO: 0.1 % (ref 0.3–6.2)
ERYTHROCYTE [DISTWIDTH] IN BLOOD BY AUTOMATED COUNT: 14.6 % (ref 12.3–15.4)
GFR SERPL CREATININE-BSD FRML MDRD: 81 ML/MIN/1.73
GLOBULIN UR ELPH-MCNC: 3.6 GM/DL
GLUCOSE SERPL-MCNC: 107 MG/DL (ref 65–99)
HCT VFR BLD AUTO: 40.8 % (ref 34–46.6)
HGB BLD-MCNC: 14 G/DL (ref 12–15.9)
HOLD SPECIMEN: NORMAL
IMM GRANULOCYTES # BLD AUTO: 0.03 10*3/MM3 (ref 0–0.05)
IMM GRANULOCYTES NFR BLD AUTO: 0.3 % (ref 0–0.5)
INR PPP: 0.88 (ref 0.8–1.2)
LYMPHOCYTES # BLD AUTO: 2.34 10*3/MM3 (ref 0.7–3.1)
LYMPHOCYTES NFR BLD AUTO: 25.1 % (ref 19.6–45.3)
MAGNESIUM SERPL-MCNC: 1.9 MG/DL (ref 1.6–2.6)
MCH RBC QN AUTO: 28.7 PG (ref 26.6–33)
MCHC RBC AUTO-ENTMCNC: 34.3 G/DL (ref 31.5–35.7)
MCV RBC AUTO: 83.6 FL (ref 79–97)
MONOCYTES # BLD AUTO: 0.86 10*3/MM3 (ref 0.1–0.9)
MONOCYTES NFR BLD AUTO: 9.2 % (ref 5–12)
NEUTROPHILS NFR BLD AUTO: 6.04 10*3/MM3 (ref 1.7–7)
NEUTROPHILS NFR BLD AUTO: 64.8 % (ref 42.7–76)
NRBC BLD AUTO-RTO: 0 /100 WBC (ref 0–0.2)
PLATELET # BLD AUTO: 229 10*3/MM3 (ref 140–450)
PMV BLD AUTO: 10.6 FL (ref 6–12)
POTASSIUM SERPL-SCNC: 4.3 MMOL/L (ref 3.5–5.2)
PROT SERPL-MCNC: 7.6 G/DL (ref 6–8.5)
PROTHROMBIN TIME: 12.3 SECONDS (ref 11.1–15.3)
RBC # BLD AUTO: 4.88 10*6/MM3 (ref 3.77–5.28)
SODIUM SERPL-SCNC: 136 MMOL/L (ref 136–145)
WBC # BLD AUTO: 9.33 10*3/MM3 (ref 3.4–10.8)

## 2020-07-16 PROCEDURE — 25010000002 MORPHINE PER 10 MG: Performed by: INTERNAL MEDICINE

## 2020-07-16 PROCEDURE — 87635 SARS-COV-2 COVID-19 AMP PRB: CPT | Performed by: INTERNAL MEDICINE

## 2020-07-16 PROCEDURE — 87040 BLOOD CULTURE FOR BACTERIA: CPT | Performed by: INTERNAL MEDICINE

## 2020-07-16 PROCEDURE — 85730 THROMBOPLASTIN TIME PARTIAL: CPT | Performed by: INTERNAL MEDICINE

## 2020-07-16 PROCEDURE — 25010000002 ENOXAPARIN PER 10 MG: Performed by: INTERNAL MEDICINE

## 2020-07-16 PROCEDURE — 80053 COMPREHEN METABOLIC PANEL: CPT | Performed by: INTERNAL MEDICINE

## 2020-07-16 PROCEDURE — 85025 COMPLETE CBC W/AUTO DIFF WBC: CPT | Performed by: INTERNAL MEDICINE

## 2020-07-16 PROCEDURE — 85610 PROTHROMBIN TIME: CPT | Performed by: INTERNAL MEDICINE

## 2020-07-16 PROCEDURE — 83735 ASSAY OF MAGNESIUM: CPT | Performed by: INTERNAL MEDICINE

## 2020-07-16 PROCEDURE — G0378 HOSPITAL OBSERVATION PER HR: HCPCS

## 2020-07-16 RX ORDER — DOXYCYCLINE 100 MG/1
100 CAPSULE ORAL EVERY 12 HOURS SCHEDULED
Status: COMPLETED | OUTPATIENT
Start: 2020-07-16 | End: 2020-07-20

## 2020-07-16 RX ORDER — FOLIC ACID 1 MG/1
1 TABLET ORAL DAILY
Status: DISCONTINUED | OUTPATIENT
Start: 2020-07-17 | End: 2020-07-23 | Stop reason: HOSPADM

## 2020-07-16 RX ORDER — ACETAMINOPHEN 325 MG/1
650 TABLET ORAL EVERY 4 HOURS PRN
Status: DISCONTINUED | OUTPATIENT
Start: 2020-07-16 | End: 2020-07-23 | Stop reason: HOSPADM

## 2020-07-16 RX ORDER — SODIUM CHLORIDE 0.9 % (FLUSH) 0.9 %
10 SYRINGE (ML) INJECTION AS NEEDED
Status: DISCONTINUED | OUTPATIENT
Start: 2020-07-16 | End: 2020-07-23 | Stop reason: HOSPADM

## 2020-07-16 RX ORDER — ONDANSETRON 2 MG/ML
4 INJECTION INTRAMUSCULAR; INTRAVENOUS EVERY 6 HOURS PRN
Status: DISCONTINUED | OUTPATIENT
Start: 2020-07-16 | End: 2020-07-23 | Stop reason: HOSPADM

## 2020-07-16 RX ORDER — LEVOTHYROXINE SODIUM 88 UG/1
88 TABLET ORAL DAILY
Status: DISCONTINUED | OUTPATIENT
Start: 2020-07-17 | End: 2020-07-23 | Stop reason: HOSPADM

## 2020-07-16 RX ORDER — SODIUM CHLORIDE 0.9 % (FLUSH) 0.9 %
10 SYRINGE (ML) INJECTION EVERY 12 HOURS SCHEDULED
Status: DISCONTINUED | OUTPATIENT
Start: 2020-07-16 | End: 2020-07-23 | Stop reason: HOSPADM

## 2020-07-16 RX ORDER — PREDNISONE 1 MG/1
5 TABLET ORAL EVERY OTHER DAY
Status: DISCONTINUED | OUTPATIENT
Start: 2020-07-17 | End: 2020-07-23 | Stop reason: HOSPADM

## 2020-07-16 RX ADMIN — ACETAMINOPHEN 650 MG: 325 TABLET, FILM COATED ORAL at 20:09

## 2020-07-16 RX ADMIN — SODIUM CHLORIDE, PRESERVATIVE FREE 10 ML: 5 INJECTION INTRAVENOUS at 20:08

## 2020-07-16 RX ADMIN — MORPHINE SULFATE 4 MG: 4 INJECTION, SOLUTION INTRAMUSCULAR; INTRAVENOUS at 20:08

## 2020-07-16 RX ADMIN — ENOXAPARIN SODIUM 130 MG: 150 INJECTION SUBCUTANEOUS at 20:31

## 2020-07-16 RX ADMIN — DOXYCYCLINE 100 MG: 100 CAPSULE ORAL at 20:31

## 2020-07-17 ENCOUNTER — READMISSION MANAGEMENT (OUTPATIENT)
Dept: CALL CENTER | Facility: HOSPITAL | Age: 50
End: 2020-07-17

## 2020-07-17 ENCOUNTER — APPOINTMENT (OUTPATIENT)
Dept: ULTRASOUND IMAGING | Facility: HOSPITAL | Age: 50
End: 2020-07-17

## 2020-07-17 ENCOUNTER — APPOINTMENT (OUTPATIENT)
Dept: CARDIOLOGY | Facility: HOSPITAL | Age: 50
End: 2020-07-17

## 2020-07-17 PROBLEM — I26.99 PULMONARY EMBOLI: Status: ACTIVE | Noted: 2020-07-17

## 2020-07-17 LAB
ANION GAP SERPL CALCULATED.3IONS-SCNC: 14 MMOL/L (ref 5–15)
BACTERIA UR QL AUTO: ABNORMAL /HPF
BASOPHILS # BLD AUTO: 0.07 10*3/MM3 (ref 0–0.2)
BASOPHILS NFR BLD AUTO: 0.8 % (ref 0–1.5)
BH CV ECHO MEAS - ACS: 2.1 CM
BH CV ECHO MEAS - AO ISTHMUS: 2.4 CM
BH CV ECHO MEAS - AO MAX PG (FULL): 5 MMHG
BH CV ECHO MEAS - AO MAX PG: 7.8 MMHG
BH CV ECHO MEAS - AO MEAN PG (FULL): 2 MMHG
BH CV ECHO MEAS - AO MEAN PG: 4 MMHG
BH CV ECHO MEAS - AO ROOT AREA (BSA CORRECTED): 1.5
BH CV ECHO MEAS - AO ROOT AREA: 8.6 CM^2
BH CV ECHO MEAS - AO ROOT DIAM: 3.3 CM
BH CV ECHO MEAS - AO V2 MAX: 140 CM/SEC
BH CV ECHO MEAS - AO V2 MEAN: 92.7 CM/SEC
BH CV ECHO MEAS - AO V2 VTI: 20.6 CM
BH CV ECHO MEAS - ASC AORTA: 2.6 CM
BH CV ECHO MEAS - AVA(I,A): 1.5 CM^2
BH CV ECHO MEAS - AVA(I,D): 1.5 CM^2
BH CV ECHO MEAS - AVA(V,A): 1.5 CM^2
BH CV ECHO MEAS - AVA(V,D): 1.5 CM^2
BH CV ECHO MEAS - BSA(HAYCOCK): 2.5 M^2
BH CV ECHO MEAS - BSA: 2.2 M^2
BH CV ECHO MEAS - BZI_BMI: 47.5 KILOGRAMS/M^2
BH CV ECHO MEAS - BZI_METRIC_HEIGHT: 162.6 CM
BH CV ECHO MEAS - BZI_METRIC_WEIGHT: 125.6 KG
BH CV ECHO MEAS - EDV(CUBED): 48.2 ML
BH CV ECHO MEAS - EDV(MOD-SP2): 50.1 ML
BH CV ECHO MEAS - EDV(MOD-SP4): 41.3 ML
BH CV ECHO MEAS - EDV(TEICH): 55.9 ML
BH CV ECHO MEAS - EF(CUBED): 81.9 %
BH CV ECHO MEAS - EF(MOD-SP2): 80.9 %
BH CV ECHO MEAS - EF(MOD-SP4): 68.8 %
BH CV ECHO MEAS - EF(TEICH): 75.5 %
BH CV ECHO MEAS - ESV(CUBED): 8.7 ML
BH CV ECHO MEAS - ESV(MOD-SP2): 9.6 ML
BH CV ECHO MEAS - ESV(MOD-SP4): 12.9 ML
BH CV ECHO MEAS - ESV(TEICH): 13.7 ML
BH CV ECHO MEAS - FS: 43.4 %
BH CV ECHO MEAS - IVS/LVPW: 1
BH CV ECHO MEAS - IVSD: 1.1 CM
BH CV ECHO MEAS - LA DIMENSION: 3.1 CM
BH CV ECHO MEAS - LA/AO: 0.94
BH CV ECHO MEAS - LV DIASTOLIC VOL/BSA (35-75): 18.4 ML/M^2
BH CV ECHO MEAS - LV MASS(C)D: 116.2 GRAMS
BH CV ECHO MEAS - LV MASS(C)DI: 51.7 GRAMS/M^2
BH CV ECHO MEAS - LV MAX PG: 2.8 MMHG
BH CV ECHO MEAS - LV MEAN PG: 2 MMHG
BH CV ECHO MEAS - LV SYSTOLIC VOL/BSA (12-30): 5.7 ML/M^2
BH CV ECHO MEAS - LV V1 MAX: 84.2 CM/SEC
BH CV ECHO MEAS - LV V1 MEAN: 64.2 CM/SEC
BH CV ECHO MEAS - LV V1 VTI: 11.9 CM
BH CV ECHO MEAS - LVIDD: 3.6 CM
BH CV ECHO MEAS - LVIDS: 2.1 CM
BH CV ECHO MEAS - LVLD AP2: 7.7 CM
BH CV ECHO MEAS - LVLD AP4: 8.1 CM
BH CV ECHO MEAS - LVLS AP2: 6.5 CM
BH CV ECHO MEAS - LVLS AP4: 6.4 CM
BH CV ECHO MEAS - LVOT AREA (M): 2.5 CM^2
BH CV ECHO MEAS - LVOT AREA: 2.5 CM^2
BH CV ECHO MEAS - LVOT DIAM: 1.8 CM
BH CV ECHO MEAS - LVPWD: 1 CM
BH CV ECHO MEAS - MV A MAX VEL: 67.3 CM/SEC
BH CV ECHO MEAS - MV DEC SLOPE: 445 CM/SEC^2
BH CV ECHO MEAS - MV E MAX VEL: 52.3 CM/SEC
BH CV ECHO MEAS - MV E/A: 0.78
BH CV ECHO MEAS - MV MAX PG: 3.1 MMHG
BH CV ECHO MEAS - MV MEAN PG: 2 MMHG
BH CV ECHO MEAS - MV P1/2T MAX VEL: 70.5 CM/SEC
BH CV ECHO MEAS - MV P1/2T: 46.4 MSEC
BH CV ECHO MEAS - MV V2 MAX: 88.6 CM/SEC
BH CV ECHO MEAS - MV V2 MEAN: 66.1 CM/SEC
BH CV ECHO MEAS - MV V2 VTI: 15.1 CM
BH CV ECHO MEAS - MVA P1/2T LCG: 3.1 CM^2
BH CV ECHO MEAS - MVA(P1/2T): 4.7 CM^2
BH CV ECHO MEAS - MVA(VTI): 2 CM^2
BH CV ECHO MEAS - PA MAX PG: 7.6 MMHG
BH CV ECHO MEAS - PA V2 MAX: 138 CM/SEC
BH CV ECHO MEAS - RAP SYSTOLE: 10 MMHG
BH CV ECHO MEAS - RVDD: 1.9 CM
BH CV ECHO MEAS - RVSP: 27.6 MMHG
BH CV ECHO MEAS - SI(AO): 78.4 ML/M^2
BH CV ECHO MEAS - SI(CUBED): 17.6 ML/M^2
BH CV ECHO MEAS - SI(LVOT): 13.5 ML/M^2
BH CV ECHO MEAS - SI(MOD-SP2): 18 ML/M^2
BH CV ECHO MEAS - SI(MOD-SP4): 12.6 ML/M^2
BH CV ECHO MEAS - SI(TEICH): 18.8 ML/M^2
BH CV ECHO MEAS - SV(AO): 176.2 ML
BH CV ECHO MEAS - SV(CUBED): 39.5 ML
BH CV ECHO MEAS - SV(LVOT): 30.3 ML
BH CV ECHO MEAS - SV(MOD-SP2): 40.5 ML
BH CV ECHO MEAS - SV(MOD-SP4): 28.4 ML
BH CV ECHO MEAS - SV(TEICH): 42.2 ML
BH CV ECHO MEAS - TR MAX VEL: 210 CM/SEC
BILIRUB UR QL STRIP: NEGATIVE
BUN SERPL-MCNC: 15 MG/DL (ref 6–20)
BUN/CREAT SERPL: 17 (ref 7–25)
CALCIUM SPEC-SCNC: 9 MG/DL (ref 8.6–10.5)
CHLORIDE SERPL-SCNC: 95 MMOL/L (ref 98–107)
CLARITY UR: ABNORMAL
CO2 SERPL-SCNC: 21 MMOL/L (ref 22–29)
COLOR UR: ABNORMAL
CREAT SERPL-MCNC: 0.88 MG/DL (ref 0.57–1)
CRP SERPL-MCNC: 21.31 MG/DL (ref 0–0.5)
DEPRECATED RDW RBC AUTO: 45.1 FL (ref 37–54)
EOSINOPHIL # BLD AUTO: 0.03 10*3/MM3 (ref 0–0.4)
EOSINOPHIL NFR BLD AUTO: 0.3 % (ref 0.3–6.2)
ERYTHROCYTE [DISTWIDTH] IN BLOOD BY AUTOMATED COUNT: 14.8 % (ref 12.3–15.4)
FERRITIN SERPL-MCNC: 641.1 NG/ML (ref 13–150)
GFR SERPL CREATININE-BSD FRML MDRD: 68 ML/MIN/1.73
GLUCOSE SERPL-MCNC: 117 MG/DL (ref 65–99)
GLUCOSE UR STRIP-MCNC: NEGATIVE MG/DL
HCT VFR BLD AUTO: 37.8 % (ref 34–46.6)
HGB BLD-MCNC: 13.1 G/DL (ref 12–15.9)
HGB UR QL STRIP.AUTO: ABNORMAL
HOLD SPECIMEN: NORMAL
HYALINE CASTS UR QL AUTO: ABNORMAL /LPF
IMM GRANULOCYTES # BLD AUTO: 0.04 10*3/MM3 (ref 0–0.05)
IMM GRANULOCYTES NFR BLD AUTO: 0.4 % (ref 0–0.5)
INR PPP: 1.02 (ref 0.8–1.2)
KETONES UR QL STRIP: NEGATIVE
L PNEUMO1 AG UR QL IA: NEGATIVE
LDH SERPL-CCNC: 209 U/L (ref 135–214)
LEUKOCYTE ESTERASE UR QL STRIP.AUTO: ABNORMAL
LV EF 2D ECHO EST: 65 %
LYMPHOCYTES # BLD AUTO: 2.38 10*3/MM3 (ref 0.7–3.1)
LYMPHOCYTES NFR BLD AUTO: 26.2 % (ref 19.6–45.3)
MCH RBC QN AUTO: 29 PG (ref 26.6–33)
MCHC RBC AUTO-ENTMCNC: 34.7 G/DL (ref 31.5–35.7)
MCV RBC AUTO: 83.8 FL (ref 79–97)
MONOCYTES # BLD AUTO: 0.98 10*3/MM3 (ref 0.1–0.9)
MONOCYTES NFR BLD AUTO: 10.8 % (ref 5–12)
NEUTROPHILS NFR BLD AUTO: 5.59 10*3/MM3 (ref 1.7–7)
NEUTROPHILS NFR BLD AUTO: 61.5 % (ref 42.7–76)
NITRITE UR QL STRIP: NEGATIVE
NRBC BLD AUTO-RTO: 0 /100 WBC (ref 0–0.2)
PH UR STRIP.AUTO: 5.5 [PH] (ref 5–9)
PLATELET # BLD AUTO: 239 10*3/MM3 (ref 140–450)
PMV BLD AUTO: 10.4 FL (ref 6–12)
POTASSIUM SERPL-SCNC: 3.8 MMOL/L (ref 3.5–5.2)
PROT UR QL STRIP: ABNORMAL
PROTHROMBIN TIME: 13.8 SECONDS (ref 11.1–15.3)
RBC # BLD AUTO: 4.51 10*6/MM3 (ref 3.77–5.28)
RBC # UR: ABNORMAL /HPF
REF LAB TEST METHOD: ABNORMAL
S PNEUM AG SPEC QL LA: NEGATIVE
SARS-COV-2 N GENE RESP QL NAA+PROBE: NOT DETECTED
SODIUM SERPL-SCNC: 130 MMOL/L (ref 136–145)
SP GR UR STRIP: 1.01 (ref 1–1.03)
SQUAMOUS #/AREA URNS HPF: ABNORMAL /HPF
UROBILINOGEN UR QL STRIP: ABNORMAL
WBC # BLD AUTO: 9.09 10*3/MM3 (ref 3.4–10.8)
WBC UR QL AUTO: ABNORMAL /HPF

## 2020-07-17 PROCEDURE — 82728 ASSAY OF FERRITIN: CPT | Performed by: INTERNAL MEDICINE

## 2020-07-17 PROCEDURE — 87899 AGENT NOS ASSAY W/OPTIC: CPT | Performed by: INTERNAL MEDICINE

## 2020-07-17 PROCEDURE — 93306 TTE W/DOPPLER COMPLETE: CPT

## 2020-07-17 PROCEDURE — 25010000002 ENOXAPARIN PER 10 MG: Performed by: INTERNAL MEDICINE

## 2020-07-17 PROCEDURE — 80048 BASIC METABOLIC PNL TOTAL CA: CPT | Performed by: INTERNAL MEDICINE

## 2020-07-17 PROCEDURE — 25010000002 MORPHINE PER 10 MG: Performed by: INTERNAL MEDICINE

## 2020-07-17 PROCEDURE — 93970 EXTREMITY STUDY: CPT

## 2020-07-17 PROCEDURE — 83615 LACTATE (LD) (LDH) ENZYME: CPT | Performed by: INTERNAL MEDICINE

## 2020-07-17 PROCEDURE — 81001 URINALYSIS AUTO W/SCOPE: CPT | Performed by: INTERNAL MEDICINE

## 2020-07-17 PROCEDURE — 86140 C-REACTIVE PROTEIN: CPT | Performed by: INTERNAL MEDICINE

## 2020-07-17 PROCEDURE — 93306 TTE W/DOPPLER COMPLETE: CPT | Performed by: INTERNAL MEDICINE

## 2020-07-17 PROCEDURE — 63710000001 PREDNISONE PER 5 MG: Performed by: INTERNAL MEDICINE

## 2020-07-17 PROCEDURE — 85025 COMPLETE CBC W/AUTO DIFF WBC: CPT | Performed by: INTERNAL MEDICINE

## 2020-07-17 PROCEDURE — 85610 PROTHROMBIN TIME: CPT | Performed by: INTERNAL MEDICINE

## 2020-07-17 RX ORDER — HYDROCODONE BITARTRATE AND ACETAMINOPHEN 5; 325 MG/1; MG/1
1 TABLET ORAL EVERY 6 HOURS PRN
Status: DISCONTINUED | OUTPATIENT
Start: 2020-07-17 | End: 2020-07-23 | Stop reason: HOSPADM

## 2020-07-17 RX ORDER — WARFARIN SODIUM 10 MG/1
10 TABLET ORAL
Status: DISCONTINUED | OUTPATIENT
Start: 2020-07-17 | End: 2020-07-21

## 2020-07-17 RX ADMIN — SODIUM CHLORIDE, PRESERVATIVE FREE 10 ML: 5 INJECTION INTRAVENOUS at 10:22

## 2020-07-17 RX ADMIN — LEVOTHYROXINE SODIUM 88 MCG: 88 TABLET ORAL at 08:34

## 2020-07-17 RX ADMIN — ACETAMINOPHEN 650 MG: 325 TABLET, FILM COATED ORAL at 01:17

## 2020-07-17 RX ADMIN — WARFARIN SODIUM 10 MG: 10 TABLET ORAL at 17:26

## 2020-07-17 RX ADMIN — MORPHINE SULFATE 4 MG: 4 INJECTION, SOLUTION INTRAMUSCULAR; INTRAVENOUS at 05:52

## 2020-07-17 RX ADMIN — SODIUM CHLORIDE, PRESERVATIVE FREE 10 ML: 5 INJECTION INTRAVENOUS at 21:41

## 2020-07-17 RX ADMIN — MORPHINE SULFATE 4 MG: 4 INJECTION, SOLUTION INTRAMUSCULAR; INTRAVENOUS at 21:09

## 2020-07-17 RX ADMIN — ENOXAPARIN SODIUM 130 MG: 150 INJECTION SUBCUTANEOUS at 22:06

## 2020-07-17 RX ADMIN — MORPHINE SULFATE 4 MG: 4 INJECTION, SOLUTION INTRAMUSCULAR; INTRAVENOUS at 01:17

## 2020-07-17 RX ADMIN — MORPHINE SULFATE 4 MG: 4 INJECTION, SOLUTION INTRAMUSCULAR; INTRAVENOUS at 14:25

## 2020-07-17 RX ADMIN — ACETAMINOPHEN 650 MG: 325 TABLET, FILM COATED ORAL at 21:15

## 2020-07-17 RX ADMIN — FOLIC ACID 1 MG: 1 TABLET ORAL at 08:34

## 2020-07-17 RX ADMIN — MORPHINE SULFATE 4 MG: 4 INJECTION, SOLUTION INTRAMUSCULAR; INTRAVENOUS at 10:21

## 2020-07-17 RX ADMIN — ENOXAPARIN SODIUM 130 MG: 150 INJECTION SUBCUTANEOUS at 08:34

## 2020-07-17 RX ADMIN — DOXYCYCLINE 100 MG: 100 CAPSULE ORAL at 22:07

## 2020-07-17 RX ADMIN — DOXYCYCLINE 100 MG: 100 CAPSULE ORAL at 08:34

## 2020-07-17 RX ADMIN — PREDNISONE 5 MG: 5 TABLET ORAL at 08:34

## 2020-07-17 NOTE — PROGRESS NOTES
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  Patient is also receiving enoxaparin  Home regimen: New start  INR Goal: 2-3  Last INR:   Lab Results   Component Value Date    INR 0.88 07/16/2020       Objective:  [Ht: 163 cm (64.17\"); Wt: 126 kg (277 lb 12.5 oz)]  Lab Results   Component Value Date    INR 0.88 07/16/2020    INR 0.96 06/29/2020    PROTIME 12.3 07/16/2020    PROTIME 12.6 06/29/2020     Lab Results   Component Value Date    HGB 13.1 07/17/2020    HGB 14.0 07/16/2020    HGB 12.6 07/01/2020    HCT 37.8 07/17/2020    HCT 40.8 07/16/2020    HCT 36.2 07/01/2020     07/17/2020     07/16/2020    PLT 76 (L) 07/01/2020           Assessment  Interacting medications: No significant interactions noted  INR is subtherapeutic as anticipated at start of therapy  H&H noted  Given age and weight will start at 10 mg daily    Plan:  1.  Give warfarin 10 mg tablet PO @ 1800 tonight  2.  Draw a PT/INR in AM  3.  Pharmacy will continue to follow    Jt Bowman RPH  07/17/20 14:31     "

## 2020-07-17 NOTE — OUTREACH NOTE
Sepsis Week 3 Survey      Responses   [facility] patient discharged from?  [place]   COVID-19 Test Status  Negative   Does the patient have one of the following disease processes/diagnoses(primary or secondary)?  Sepsis   Week 3 attempt successful?  No   Revoke  Readmitted          Yasmine Barlow RN

## 2020-07-17 NOTE — PLAN OF CARE
Problem: Patient Care Overview  Goal: Plan of Care Review  Outcome: Ongoing (interventions implemented as appropriate)  Flowsheets  Taken 7/17/2020 0332  Progress: no change  Outcome Summary: BP recently high and continous low grade fever, air turned down, tylenol and cool rags were given, called hopsitalist, see note, will cont to monitor  Taken 7/16/2020 2007  Plan of Care Reviewed With: patient

## 2020-07-17 NOTE — NURSING NOTE
Called Dr. Watters due to pt BP of 145/108, with left shoulder blade pain that extends to her lower left lobe of her lung. Right before taking the BP I assisted her to lay in the bed. After laying in bed she was complaining of pain but its not time for her pain medication, will cont to monitor.

## 2020-07-17 NOTE — H&P
[facility] Medicine Services  INPATIENT HISTORY AND PHYSICAL       Patient Care Team:  Javed Thomson MD as PCP - General (Family Medicine)    Chief complaint Chest pain      Subjective     Patient is a 50 y.o. female with past medical history of rheumatoid arthritis, morbid obesity, hypothyroidism and recent admission and treatment for sepsis and ehrlichiosis.  She presents with complaints of worsening pleuritic chest pain of 3 days duration. She has slight shortness of breath on exertion but denied fevers, cough, sore throat or runny nose.  She presented to the urgent care and blood work done and imaging was suggestive of pulmonary embolism.  She was transferred as a direct admit.  At the time of my evaluation patient complains of pleuritic chest pain and back pain from laying on the CT scanner.  She denies lightheadedness.  She states that she has been with activity mobile at home and only slightly limited by her fatigue from ehrlichiosis.  She denies long distance travel or trauma to her legs.    Review of Systems   Constitutional: Positive for fatigue. Negative for appetite change, chills and fever.   HENT: Negative for congestion and sore throat.    Eyes: Negative for pain and redness.   Respiratory: Positive for shortness of breath. Negative for cough, chest tightness and wheezing.    Cardiovascular: Positive for chest pain. Negative for palpitations and leg swelling.   Gastrointestinal: Negative for abdominal pain, constipation, diarrhea, nausea and vomiting.   Genitourinary: Negative for dysuria and hematuria.   Musculoskeletal: Negative for arthralgias, joint swelling and neck pain.   Skin: Negative for color change and rash.   Neurological: Negative for dizziness, syncope, light-headedness and headaches.   Hematological: Negative for adenopathy.   Psychiatric/Behavioral: Negative for agitation and confusion. The patient is not nervous/anxious.       History  Past Medical History:   Diagnosis Date   • Carpal tunnel syndrome    • Chronic bronchitis (CMS/HCC)    • Hypothyroidism    • Obesity    • RA (rheumatoid arthritis) (CMS/HCC)      Past Surgical History:   Procedure Laterality Date   • BONE RESECTION, RIB     • ENDOMETRIAL ABLATION     • VEIN BYPASS SURGERY       Family History   Problem Relation Age of Onset   • Hypertension Father      Social History     Tobacco Use   • Smoking status: Current Every Day Smoker     Packs/day: 1.50     Types: Cigarettes   • Smokeless tobacco: Never Used   Substance Use Topics   • Alcohol use: Not Currently   • Drug use: Not Currently     Medications Prior to Admission   Medication Sig Dispense Refill Last Dose   • folic acid (FOLVITE) 1 MG tablet Take 1 mg by mouth Daily.   6/28/2020 at Unknown time   • levothyroxine (SYNTHROID, LEVOTHROID) 88 MCG tablet Take 88 mcg by mouth Daily.   6/29/2020 at Unknown time   • predniSONE (DELTASONE) 5 MG tablet Take 5 mg by mouth Every Other Day.   6/28/2020 at Unknown time     Allergies:  Patient has no known allergies.  Prior to Admission medications    Medication Sig Start Date End Date Taking? Authorizing Provider   folic acid (FOLVITE) 1 MG tablet Take 1 mg by mouth Daily.    ProviderJorge MD   levothyroxine (SYNTHROID, LEVOTHROID) 88 MCG tablet Take 88 mcg by mouth Daily.    ProviderJorge MD   predniSONE (DELTASONE) 5 MG tablet Take 5 mg by mouth Every Other Day.    ProviderJorge MD     I have reviewed the patient's current medications    Objective      Vital Signs  Temp:  [101 °F (38.3 °C)] 101 °F (38.3 °C)  Heart Rate:  [114] 114  Resp:  [20] 20  BP: (112)/(80) 112/80    Physical Exam   Constitutional: She is oriented to person, place, and time. No distress.   Morbidly obese   HENT:   Head: Normocephalic and atraumatic.   Mouth/Throat: Oropharynx is clear and moist. No oropharyngeal exudate.   Eyes: Pupils are equal, round, and reactive to light.  Conjunctivae and EOM are normal. No scleral icterus.   Neck: Normal range of motion. Neck supple. No JVD present. No tracheal deviation present. No thyromegaly present.   Cardiovascular: Normal rate, regular rhythm, normal heart sounds and intact distal pulses. Exam reveals no gallop and no friction rub.   No murmur heard.  Pulmonary/Chest: Effort normal and breath sounds normal. No stridor. No respiratory distress. She has no wheezes. She has no rales. She exhibits no tenderness.   Abdominal: Soft. Bowel sounds are normal. She exhibits no distension and no mass. There is no tenderness. There is no rebound and no guarding. No hernia.   Musculoskeletal: Normal range of motion. She exhibits no edema, tenderness or deformity.   Lymphadenopathy:     She has no cervical adenopathy.   Neurological: She is alert and oriented to person, place, and time. No cranial nerve deficit. She exhibits normal muscle tone.   Skin: Skin is warm and dry. No rash noted. She is not diaphoretic. No erythema. No pallor.   Psychiatric: She has a normal mood and affect. Her behavior is normal. Judgment and thought content normal.         Results Review:     Results from last 7 days   Lab Units 07/16/20 2006   SODIUM mmol/L 136   POTASSIUM mmol/L 4.3   CHLORIDE mmol/L 102   CO2 mmol/L 22.0   BUN mg/dL 10   CREATININE mg/dL 0.76   GLUCOSE mg/dL 107*   CALCIUM mg/dL 9.5   BILIRUBIN mg/dL 0.9   ALK PHOS U/L 122*   ALT (SGPT) U/L 17   AST (SGOT) U/L 18       Results from last 7 days   Lab Units 07/16/20 2006   MAGNESIUM mg/dL 1.9       Results from last 7 days   Lab Units 07/16/20 2006   WBC 10*3/mm3 9.33   HEMOGLOBIN g/dL 14.0   HEMATOCRIT % 40.8   PLATELETS 10*3/mm3 229       Results from last 7 days   Lab Units 07/16/20 2006   INR  0.88     -dimer (split fibrin) (07/16/2020 3:05 PM CDT)  D-dimer (split fibrin) (07/16/2020 3:05 PM CDT)   Component Value Ref Range Performed At [facility]   D-Dimer 1,250.0000 (H) <600 [facility]        D-dimer (split fibrin) (07/16/2020 3:05 PM CDT)   Specimen   Whole Blood - Whole blood sample (specimen)     D-dimer (split fibrin) (07/16/2020 3:05 PM CDT)   Performing Organization Address City/State/Zipcode Phone Number   [facility]   [address] [place] [address]              Imaging Results (Last 7 Days)     ** No results found for the last 168 hours. **           CTA Chest Pulmonary Embolism w/Contrast per Contrast Protocol (07/16/2020 4:54 PM CDT)  CTA Chest Pulmonary Embolism w/Contrast per Contrast Protocol (07/16/2020 4:54 PM CDT)   Specimen         CTA Chest Pulmonary Embolism w/Contrast per Contrast Protocol (07/16/2020 4:54 PM CDT)   Impressions Performed At    Large central emboli in the left lung that correlates nicely with the chest film        [id number]   [facility]       CTA Chest Pulmonary Embolism w/Contrast per Contrast Protocol (07/16/2020 4:54 PM CDT)   Narrative Performed At   Left chest pain, elevated d-dimer         CT a pulmonary arteries with 100 cc of Omnipaque and 3D reconstructed projected images: There are large central emboli in the proximal left lower lobe pulmonary artery and in the artery to the lingula . There is atelectatic change in the infiltrate in     the lingula and pleural thickening and pleural fluid .       [facility]       CTA Chest Pulmonary Embolism w/Contrast per Contrast Protocol (07/16/2020 4:54 PM CDT)   Procedure Note   Reginaldo, Rad Results In - 07/16/2020 5:11 PM CDT    Left chest pain, elevated d-dimer     CT a pulmonary arteries with 100 cc of Omnipaque and 3D reconstructed projected images: There are large central emboli in the proximal left lower lobe pulmonary artery and in the artery to the lingula . There is atelectatic change in the infiltrate in   the lingula and pleural thickening and pleural fluid .    IMPRESSION:   Large central emboli in the left lung that correlates nicely with the chest film    [id number]       CTA Chest  Pulmonary Embolism w/Contrast per Contrast Protocol (07/16/2020 4:54 PM CDT)   Performing Organization Address City/State/Zipcode Phone Number   [facility]   [address] [place] [address]        Back to top of Results       XR Chest PA/AP and Lateral (07/16/2020 3:19 PM CDT)  Only the most recent of 2 results within the time period is included.    XR Chest PA/AP and Lateral (07/16/2020 3:19 PM CDT)   Specimen         XR Chest PA/AP and Lateral (07/16/2020 3:19 PM CDT)   Impressions Performed At    Infiltrate in the anterior segment of the left upper lobe and superior segment of the lingula with associated pleural thickening        [id number]   [facility]       XR Chest PA/AP and Lateral (07/16/2020 3:19 PM CDT)   Narrative Performed At   Left side chest pain .        PA and lateral chest: The bones are intact . There is infiltrate and/or atelectatic change in the anterior segment of the left upper lobe and the superior segment of the lingula with pleural thickening and blunting of the costophrenic sulcus; the     posterior gutters are sharply demarcated and this may represent pleural thickening rather than pleural fluid . The right lung is clear . The heart is borderline in size .       [facility]       XR Chest PA/AP and Lateral (07/16/2020 3:19 PM CDT)   Procedure Note   Reginaldo, Rad Results In - 07/16/2020 3:24 PM CDT    Left side chest pain .    PA and lateral chest: The bones are intact . There is infiltrate and/or atelectatic change in the anterior segment of the left upper lobe and the superior segment of the lingula with pleural thickening and blunting of the costophrenic sulcus; the   posterior gutters are sharply demarcated and this may represent pleural thickening rather than pleural fluid . The right lung is clear . The heart is borderline in size .    IMPRESSION:   Infiltrate in the anterior segment of the left upper lobe and superior segment of the lingula with associated pleural  [facility]    [id number]       XR Chest PA/AP and Lateral (07/16/2020 3:19 PM CDT)   Performing Organization Address City/State/Zipcode Phone Number         Assessment / Plan       Hospital Problem List:  Active Problems:    Acute pulmonary embolism (CMS/HCC)  Suspected COVID-19 pneumonia  Recent ehrlichiosis infection  Morbid obesity  Hypothyroidism  History of rheumatoid arthritis    Plan  - Patient has 3-day history of chest pain with chest x-ray and CT scanning at the urgent care showing an infiltrate in the left upper lobe along with left lung segmental pulmonary embolism.  -She has no leukocytosis and was febrile on admission.  -Suspicion for COVID-19 infection is high given her DVT.  -Place in isolation  -Send COVID-19 swab  -Send blood cultures, urine Legionella and strep pneumo antigens  -Start Lovenox treatment dose with pharmacy to dose  -Echocardiogram  -Doppler ultrasound bilateral legs  -Pain control  -Continue p.o. doxycycline for recent ehrlichiosis infection  -DVT prophylaxis with subcutaneous Lovenox  -CODE STATUS is full code    I discussed the patient's findings and my recommendations with patient.    Dru Valles MD  07/16/20  19:39        Part of this note may be an electronic transcription/translation of spoken language to printed text using the Dragon Dictation System.

## 2020-07-18 ENCOUNTER — APPOINTMENT (OUTPATIENT)
Dept: GENERAL RADIOLOGY | Facility: HOSPITAL | Age: 50
End: 2020-07-18

## 2020-07-18 LAB
ANION GAP SERPL CALCULATED.3IONS-SCNC: 12 MMOL/L (ref 5–15)
BASOPHILS # BLD AUTO: 0.06 10*3/MM3 (ref 0–0.2)
BASOPHILS NFR BLD AUTO: 0.6 % (ref 0–1.5)
BUN SERPL-MCNC: 11 MG/DL (ref 6–20)
BUN/CREAT SERPL: 12.9 (ref 7–25)
CALCIUM SPEC-SCNC: 9.4 MG/DL (ref 8.6–10.5)
CHLORIDE SERPL-SCNC: 94 MMOL/L (ref 98–107)
CO2 SERPL-SCNC: 22 MMOL/L (ref 22–29)
CREAT SERPL-MCNC: 0.85 MG/DL (ref 0.57–1)
DEPRECATED RDW RBC AUTO: 45.2 FL (ref 37–54)
EOSINOPHIL # BLD AUTO: 0.03 10*3/MM3 (ref 0–0.4)
EOSINOPHIL NFR BLD AUTO: 0.3 % (ref 0.3–6.2)
ERYTHROCYTE [DISTWIDTH] IN BLOOD BY AUTOMATED COUNT: 14.7 % (ref 12.3–15.4)
GFR SERPL CREATININE-BSD FRML MDRD: 71 ML/MIN/1.73
GLUCOSE SERPL-MCNC: 105 MG/DL (ref 65–99)
HCT VFR BLD AUTO: 36.6 % (ref 34–46.6)
HGB BLD-MCNC: 12.3 G/DL (ref 12–15.9)
IMM GRANULOCYTES # BLD AUTO: 0.04 10*3/MM3 (ref 0–0.05)
IMM GRANULOCYTES NFR BLD AUTO: 0.4 % (ref 0–0.5)
INR PPP: 1.12 (ref 0.8–1.2)
LYMPHOCYTES # BLD AUTO: 2.76 10*3/MM3 (ref 0.7–3.1)
LYMPHOCYTES NFR BLD AUTO: 26.5 % (ref 19.6–45.3)
MCH RBC QN AUTO: 28.3 PG (ref 26.6–33)
MCHC RBC AUTO-ENTMCNC: 33.6 G/DL (ref 31.5–35.7)
MCV RBC AUTO: 84.3 FL (ref 79–97)
MONOCYTES # BLD AUTO: 1.1 10*3/MM3 (ref 0.1–0.9)
MONOCYTES NFR BLD AUTO: 10.6 % (ref 5–12)
NEUTROPHILS NFR BLD AUTO: 6.41 10*3/MM3 (ref 1.7–7)
NEUTROPHILS NFR BLD AUTO: 61.6 % (ref 42.7–76)
NRBC BLD AUTO-RTO: 0 /100 WBC (ref 0–0.2)
PLATELET # BLD AUTO: 249 10*3/MM3 (ref 140–450)
PMV BLD AUTO: 10.5 FL (ref 6–12)
POTASSIUM SERPL-SCNC: 3.7 MMOL/L (ref 3.5–5.2)
PROTHROMBIN TIME: 14.9 SECONDS (ref 11.1–15.3)
RBC # BLD AUTO: 4.34 10*6/MM3 (ref 3.77–5.28)
SODIUM SERPL-SCNC: 128 MMOL/L (ref 136–145)
WBC # BLD AUTO: 10.4 10*3/MM3 (ref 3.4–10.8)

## 2020-07-18 PROCEDURE — 25010000002 ENOXAPARIN PER 10 MG: Performed by: INTERNAL MEDICINE

## 2020-07-18 PROCEDURE — 80048 BASIC METABOLIC PNL TOTAL CA: CPT | Performed by: INTERNAL MEDICINE

## 2020-07-18 PROCEDURE — 25010000002 CEFTRIAXONE PER 250 MG: Performed by: INTERNAL MEDICINE

## 2020-07-18 PROCEDURE — 94799 UNLISTED PULMONARY SVC/PX: CPT

## 2020-07-18 PROCEDURE — 71045 X-RAY EXAM CHEST 1 VIEW: CPT

## 2020-07-18 PROCEDURE — 85025 COMPLETE CBC W/AUTO DIFF WBC: CPT | Performed by: INTERNAL MEDICINE

## 2020-07-18 PROCEDURE — 25010000002 MORPHINE PER 10 MG: Performed by: INTERNAL MEDICINE

## 2020-07-18 PROCEDURE — 85610 PROTHROMBIN TIME: CPT | Performed by: INTERNAL MEDICINE

## 2020-07-18 RX ADMIN — FOLIC ACID 1 MG: 1 TABLET ORAL at 08:26

## 2020-07-18 RX ADMIN — MORPHINE SULFATE 4 MG: 4 INJECTION, SOLUTION INTRAMUSCULAR; INTRAVENOUS at 23:02

## 2020-07-18 RX ADMIN — SODIUM CHLORIDE, PRESERVATIVE FREE 10 ML: 5 INJECTION INTRAVENOUS at 08:26

## 2020-07-18 RX ADMIN — DOXYCYCLINE 100 MG: 100 CAPSULE ORAL at 08:26

## 2020-07-18 RX ADMIN — MORPHINE SULFATE 4 MG: 4 INJECTION, SOLUTION INTRAMUSCULAR; INTRAVENOUS at 01:59

## 2020-07-18 RX ADMIN — ENOXAPARIN SODIUM 130 MG: 150 INJECTION SUBCUTANEOUS at 08:25

## 2020-07-18 RX ADMIN — MORPHINE SULFATE 4 MG: 4 INJECTION, SOLUTION INTRAMUSCULAR; INTRAVENOUS at 18:09

## 2020-07-18 RX ADMIN — ENOXAPARIN SODIUM 130 MG: 150 INJECTION SUBCUTANEOUS at 21:00

## 2020-07-18 RX ADMIN — SODIUM CHLORIDE, PRESERVATIVE FREE 10 ML: 5 INJECTION INTRAVENOUS at 18:09

## 2020-07-18 RX ADMIN — MORPHINE SULFATE 4 MG: 4 INJECTION, SOLUTION INTRAMUSCULAR; INTRAVENOUS at 10:12

## 2020-07-18 RX ADMIN — ACETAMINOPHEN 650 MG: 325 TABLET, FILM COATED ORAL at 20:52

## 2020-07-18 RX ADMIN — MORPHINE SULFATE 4 MG: 4 INJECTION, SOLUTION INTRAMUSCULAR; INTRAVENOUS at 14:16

## 2020-07-18 RX ADMIN — DOXYCYCLINE 100 MG: 100 CAPSULE ORAL at 21:00

## 2020-07-18 RX ADMIN — CEFTRIAXONE SODIUM 2 G: 2 INJECTION, POWDER, FOR SOLUTION INTRAMUSCULAR; INTRAVENOUS at 10:13

## 2020-07-18 RX ADMIN — LEVOTHYROXINE SODIUM 88 MCG: 88 TABLET ORAL at 08:26

## 2020-07-18 RX ADMIN — SODIUM CHLORIDE, PRESERVATIVE FREE 10 ML: 5 INJECTION INTRAVENOUS at 21:05

## 2020-07-18 RX ADMIN — MORPHINE SULFATE 4 MG: 4 INJECTION, SOLUTION INTRAMUSCULAR; INTRAVENOUS at 06:08

## 2020-07-18 RX ADMIN — SODIUM CHLORIDE, PRESERVATIVE FREE 10 ML: 5 INJECTION INTRAVENOUS at 10:13

## 2020-07-18 RX ADMIN — ACETAMINOPHEN 650 MG: 325 TABLET, FILM COATED ORAL at 04:15

## 2020-07-18 RX ADMIN — WARFARIN SODIUM 10 MG: 10 TABLET ORAL at 16:44

## 2020-07-18 NOTE — PROGRESS NOTES
[facility] Medicine Services  INPATIENT PROGRESS NOTE    Length of Stay: 1  Date of Admission: 7/16/2020  Primary Care Physician: Javed Thomson MD    Subjective   Chief Complaint: Chest pain    HPI: Patient presented with chest pain, pleuritic in nature of 3 days duration. She is being monitored for pulmonary embolism. Today she states that the chest pain is slightly improved. She is also continued treatment for ehrlichiosis which she recently had. Patient had some fevers this morning and is requiring 2 L of oxygen by nasal cannula.    Review of Systems  Constitutional: Positive for fatigue, fever. Negative for appetite change, chills and fever.   HENT: Negative for congestion and sore throat.    Eyes: Negative for pain and redness.   Respiratory: Positive for shortness of breath. Negative for cough, chest tightness and wheezing.    Cardiovascular: Positive for chest pain. Negative for palpitations and leg swelling.   Gastrointestinal: Negative for abdominal pain, constipation, diarrhea, nausea and vomiting.   Genitourinary: Negative for dysuria and hematuria.   Musculoskeletal: Negative for arthralgias, joint swelling and neck pain.   Skin: Negative for color change and rash.   Neurological: Negative for dizziness, syncope, light-headedness and headaches.   Hematological: Negative for adenopathy.   Psychiatric/Behavioral: Negative for agitation and confusion. The patient is not nervous/anxious    Objective    Temp:  [99 °F (37.2 °C)-100.9 °F (38.3 °C)] 99.4 °F (37.4 °C)  Heart Rate:  [] 113  Resp:  [18-22] 20  BP: (115-156)/(66-94) 139/81    Physical Exam  Constitutional: She is oriented to person, place, and time. No distress.   Morbidly obese   HENT:   Head: Normocephalic and atraumatic.   Mouth/Throat: Oropharynx is clear and moist. No oropharyngeal exudate.   Eyes: Pupils are equal, round, and reactive to light. Conjunctivae and EOM are normal. No scleral  icterus.   Neck: Normal range of motion. Neck supple. No JVD present. No tracheal deviation present. No thyromegaly present.   Cardiovascular: Normal rate, regular rhythm, normal heart sounds and intact distal pulses. Exam reveals no gallop and no friction rub.   No murmur heard.  Pulmonary/Chest: Effort normal and breath sounds normal. No stridor. No respiratory distress. She has no wheezes. She has no rales. She exhibits no tenderness.   Abdominal: Soft. Bowel sounds are normal. She exhibits no distension and no mass. There is no tenderness. There is no rebound and no guarding. No hernia.   Musculoskeletal: Normal range of motion. She exhibits no edema, tenderness or deformity.   Lymphadenopathy:     She has no cervical adenopathy.   Neurological: She is alert and oriented to person, place, and time. No cranial nerve deficit. She exhibits normal muscle tone.   Skin: Skin is warm and dry. No rash noted. She is not diaphoretic. No erythema. No pallor.   Psychiatric: She has a normal mood and affect. Her behavior is normal. Judgment and thought content normal.     Medication Review:    Current Facility-Administered Medications:   •  acetaminophen (TYLENOL) tablet 650 mg, 650 mg, Oral, Q4H PRN, Dru Valles MD, 650 mg at 07/18/20 0415  •  cefTRIAXone (ROCEPHIN) 2 g/100 mL 0.9% NS VTB (HEIDY), 2 g, Intravenous, Q24H, Dru Valles MD, 2 g at 07/18/20 1013  •  doxycycline (MONODOX) capsule 100 mg, 100 mg, Oral, Q12H, Dru Valles MD, 100 mg at 07/18/20 0826  •  enoxaparin (LOVENOX) injection 130 mg, 1 mg/kg, Subcutaneous, Q12H, Dru Valles MD, 130 mg at 07/18/20 0825  •  folic acid (FOLVITE) tablet 1 mg, 1 mg, Oral, Daily, Dru Valles MD, 1 mg at 07/18/20 0826  •  HYDROcodone-acetaminophen (NORCO) 5-325 MG per tablet 1 tablet, 1 tablet, Oral, Q6H PRN, Dru Valles MD  •  levothyroxine (SYNTHROID, LEVOTHROID) tablet 88 mcg, 88 mcg, Oral, Daily, Dru Valles MD, 88 mcg at  07/18/20 0826  •  morphine injection 4 mg, 4 mg, Intravenous, Q4H PRN, Dru Valles MD, 4 mg at 07/18/20 1416  •  ondansetron (ZOFRAN) injection 4 mg, 4 mg, Intravenous, Q6H PRN, Dru Valles MD  •  Pharmacy to Dose enoxaparin (LOVENOX), , Does not apply, Continuous PRN, Dru Valles MD  •  Pharmacy to dose warfarin, , Does not apply, Continuous PRN, Dru Valles MD  •  predniSONE (DELTASONE) tablet 5 mg, 5 mg, Oral, Every Other Day, Dru Valles MD, 5 mg at 07/17/20 0834  •  sodium chloride 0.9 % flush 10 mL, 10 mL, Intravenous, Q12H, Dru Valles MD, 10 mL at 07/18/20 0826  •  sodium chloride 0.9 % flush 10 mL, 10 mL, Intravenous, PRN, Dru Valles MD, 10 mL at 07/18/20 1013  •  warfarin (COUMADIN) tablet 10 mg, 10 mg, Oral, Daily, Dru Valles MD, 10 mg at 07/17/20 1726    I have reviewed the patient's current medications.     Results Review:  I have reviewed the labs, radiology results, and diagnostic studies.    Laboratory Data:   Results from last 7 days   Lab Units 07/18/20  0546 07/17/20  0855 07/16/20 2006   SODIUM mmol/L 128* 130* 136   POTASSIUM mmol/L 3.7 3.8 4.3   CHLORIDE mmol/L 94* 95* 102   CO2 mmol/L 22.0 21.0* 22.0   BUN mg/dL 11 15 10   CREATININE mg/dL 0.85 0.88 0.76   GLUCOSE mg/dL 105* 117* 107*   CALCIUM mg/dL 9.4 9.0 9.5   BILIRUBIN mg/dL  --   --  0.9   ALK PHOS U/L  --   --  122*   ALT (SGPT) U/L  --   --  17   AST (SGOT) U/L  --   --  18   ANION GAP mmol/L 12.0 14.0 12.0     Estimated Creatinine Clearance: 105.5 mL/min (by C-G formula based on SCr of 0.85 mg/dL).  Results from last 7 days   Lab Units 07/16/20 2006   MAGNESIUM mg/dL 1.9         Results from last 7 days   Lab Units 07/18/20  0545 07/17/20  0855 07/16/20 2006   WBC 10*3/mm3 10.40 9.09 9.33   HEMOGLOBIN g/dL 12.3 13.1 14.0   HEMATOCRIT % 36.6 37.8 40.8   PLATELETS 10*3/mm3 249 239 229     Results from last 7 days   Lab Units 07/18/20  0546 07/17/20  1423 07/16/20 2006    INR  1.12 1.02 0.88       Culture Data:   Blood Culture   Date Value Ref Range Status   07/16/2020 No growth at 24 hours  Preliminary   07/16/2020 No growth at 24 hours  Preliminary     No results found for: URINECX  No results found for: RESPCX  No results found for: WOUNDCX  No results found for: STOOLCX  No components found for: BODYFLD    Radiology Data:   Imaging Results (Last 24 Hours)     Procedure Component Value Units Date/Time    XR Chest 1 View [[id number]] Collected:  07/18/20 1016     Updated:  07/18/20 1245    Narrative:       PROCEDURE: XR CHEST 1 VW    VIEWS:Single    INDICATION: Shortness of breath, fever    COMPARISON: CXR: 6/29/2020    FINDINGS:       - lines/tubes: None    - cardiac: Size within normal limits.    - mediastinum: Contour within normal limits.     - lungs:   L5 opacity in the left lung base, may represent atelectasis  and/or consolidation..     - pleura: Blunting of left costophrenic angle, and a pleural  effusion may be present.      - osseous: Unremarkable for age.        Impression:       Ill-defined left base opacification, may represent atelectasis,  consolidation, effusion, or a combination of these      Electronically signed by:  Leah Kaminski MD  7/18/2020 12:16 PM  CDT Workstation: [id number]          Assessment/Plan     Hospital Problem List:  Active Problems:    Acute pulmonary embolism (CMS/HCC)    Pulmonary emboli (CMS/HCC)  Left lower lobe pneumonia  Acute respiratory failure with hypoxia  Recent ehrlichiosis infection  Morbid obesity  Hypothyroidism  History of rheumatoid arthritis     Plan  -Patient has 3-day history of chest pain with chest x-ray and CT scanning at the urgent care showing an infiltrate in the left upper lobe along with left lung segmental pulmonary embolism.  -She was febrile on admission however COVID-19 test was negative.  -She has not been breathing deeply due to pleuritic chest pain and suspect she is developing pneumonia over the left lower  lobe.  -Incentive spirometry  -We will start IV antibiotics with ceftriaxone  -Urine Legionella and strep pneumo antigens negative.  Follow-up blood cultures.  -Continue Lovenox treatment dose with pharmacy to dose. Will bridge to Coumadin as patient is morbidly obese and DOACS not first line agents.  -Patient will need 72 hours of overlap therapy prior to discharge.  -Continue oxygen by nasal cannula to keep O2 sat greater than 92%  -Echocardiogram showed normal ejection fraction and was unremarkable.  Doppler ultrasound of legs was negative.  -Pain control  -Continue p.o. doxycycline for recent ehrlichiosis infection  -DVT prophylaxis with subcutaneous Lovenox and bridged to Coumadin  -CODE STATUS is full code     Discharge Planning: In progress    Dru Valles MD   07/18/20   15:09

## 2020-07-18 NOTE — PLAN OF CARE
"  Problem: Patient Care Overview  Goal: Plan of Care Review  Outcome: Ongoing (interventions implemented as appropriate)  Flowsheets (Taken 7/18/2020 5109)  Progress: no change  Plan of Care Reviewed With: patient  Outcome Summary: Continues low grade fever, Tylenol helps. Vitals good. Complain of \"lung\" pain, Morphines relieves.     "

## 2020-07-18 NOTE — PLAN OF CARE
Problem: Patient Care Overview  Goal: Plan of Care Review  Outcome: Ongoing (interventions implemented as appropriate)  Note:    Reg diet, no intakes. Current wt 277#, BMI 47.2, indicative of morbid obesity. RD following.

## 2020-07-18 NOTE — PROGRESS NOTES
Adult Nutrition  Assessment    Patient Name:  Sid Cm  YOB: 1970  MRN: [id number]  Admit Date:  7/16/2020    Assessment Date:  7/18/2020    Comments:  49yo female admit w/ pulmonary embolism, noted elev BP w/ low grade fever ?etiology. COVID 19 negative per MD notes. PMH included RA. Labs and meds noted. Reg diet, no intakes. Current wt 277#, BMI 47.2 and 230% IBW, indicative of morbid obesity. Unable to interview pt today, will attempt at later date and complete diet education as appropriate. RD following.    Reason for Assessment     Row Name 07/18/20 1332          Reason for Assessment    Reason For Assessment  identified at risk by screening criteria     Diagnosis  -- pulmonary embolism, elev BP and fever     Identified At Risk by Screening Criteria  BMI         Nutrition/Diet History     Row Name 07/18/20 1339          Nutrition/Diet History    Typical Food/Fluid Intake  Pt with Nursing and door closed upon RD visit           Labs/Tests/Procedures/Meds     Row Name 07/18/20 1340          Labs/Procedures/Meds    Lab Results Reviewed  reviewed     Lab Results Comments  Glu 105-117, Na 128L, Alb 4.0, elev CRP        Diagnostic Tests/Procedures    Diagnostic Test/Procedure Reviewed  reviewed     Diagnostic Test/Procedures Comments  negative COVID 19, ECHO, doppler US ble        Medications    Pertinent Medications Reviewed  reviewed     Pertinent Medications Comments  Fe, deltasone, coumadin           Estimated/Assessed Needs     Row Name 07/18/20 1341          Calculation Measurements    Weight Used For Calculations  72.1 kg (159 lb) adjusted        Estimated/Assessed Needs    Additional Documentation  Fluid Requirements (Group);Protein Requirements (Group);Calorie Requirements (Group);KCAL/KG (Group)        Calorie Requirements    Estimated Calorie Requirement (kcal/day)  1800        KCAL/KG    KCAL/KG  25 Kcal/Kg (kcal)     25 Kcal/Kg (kcal)  1803.05        Protein Requirements    Weight  Used For Protein Calculations  72.1 kg (159 lb)     Est Protein Requirement Amount (gms/kg)  0.8 gm protein     Estimated Protein Requirements (gms/day)  57.7        Fluid Requirements    Estimated Fluid Requirements (mL/day)  1800     RDA Method (mL)  1800         Nutrition Prescription Ordered     Row Name 07/18/20 1341          Nutrition Prescription PO    Current PO Diet  Regular         Evaluation of Received Nutrient/Fluid Intake     Row Name 07/18/20 1341          PO Evaluation    Number of Days PO Intake Evaluated  Insufficient Data               Electronically signed by:  Radha Reddy RD  07/18/20 13:43

## 2020-07-18 NOTE — PROGRESS NOTES
[facility] Medicine Services  INPATIENT PROGRESS NOTE    Length of Stay: 0  Date of Admission: 7/16/2020  Primary Care Physician: Javed Thomson MD    Subjective   Chief Complaint: Chest pain    HPI: Patient presented with chest pain of pleuritic in nature of 3 days duration.  She is being monitored for pulmonary embolism.  Today she states that the chest pain is slightly improved.  She is also continued treatment for ehrlichiosis which she recently had.    Review of Systems  Constitutional: Positive for fatigue. Negative for appetite change, chills and fever.   HENT: Negative for congestion and sore throat.    Eyes: Negative for pain and redness.   Respiratory: Positive for shortness of breath. Negative for cough, chest tightness and wheezing.    Cardiovascular: Positive for chest pain. Negative for palpitations and leg swelling.   Gastrointestinal: Negative for abdominal pain, constipation, diarrhea, nausea and vomiting.   Genitourinary: Negative for dysuria and hematuria.   Musculoskeletal: Negative for arthralgias, joint swelling and neck pain.   Skin: Negative for color change and rash.   Neurological: Negative for dizziness, syncope, light-headedness and headaches.   Hematological: Negative for adenopathy.   Psychiatric/Behavioral: Negative for agitation and confusion. The patient is not nervous/anxious    Objective    Temp:  [98.4 °F (36.9 °C)-100.2 °F (37.9 °C)] 100.2 °F (37.9 °C)  Heart Rate:  [] 105  Resp:  [18-21] 18  BP: ()/() 136/83    Physical Exam  Constitutional: She is oriented to person, place, and time. No distress.   Morbidly obese   HENT:   Head: Normocephalic and atraumatic.   Mouth/Throat: Oropharynx is clear and moist. No oropharyngeal exudate.   Eyes: Pupils are equal, round, and reactive to light. Conjunctivae and EOM are normal. No scleral icterus.   Neck: Normal range of motion. Neck supple. No JVD present. No tracheal  deviation present. No thyromegaly present.   Cardiovascular: Normal rate, regular rhythm, normal heart sounds and intact distal pulses. Exam reveals no gallop and no friction rub.   No murmur heard.  Pulmonary/Chest: Effort normal and breath sounds normal. No stridor. No respiratory distress. She has no wheezes. She has no rales. She exhibits no tenderness.   Abdominal: Soft. Bowel sounds are normal. She exhibits no distension and no mass. There is no tenderness. There is no rebound and no guarding. No hernia.   Musculoskeletal: Normal range of motion. She exhibits no edema, tenderness or deformity.   Lymphadenopathy:     She has no cervical adenopathy.   Neurological: She is alert and oriented to person, place, and time. No cranial nerve deficit. She exhibits normal muscle tone.   Skin: Skin is warm and dry. No rash noted. She is not diaphoretic. No erythema. No pallor.   Psychiatric: She has a normal mood and affect. Her behavior is normal. Judgment and thought content normal.     Medication Review:    Current Facility-Administered Medications:   •  acetaminophen (TYLENOL) tablet 650 mg, 650 mg, Oral, Q4H PRN, Dru Valles MD, 650 mg at 07/17/20 0117  •  doxycycline (MONODOX) capsule 100 mg, 100 mg, Oral, Q12H, Dru Valles MD, 100 mg at 07/17/20 0834  •  enoxaparin (LOVENOX) injection 130 mg, 1 mg/kg, Subcutaneous, Q12H, Dru Valles MD, 130 mg at 07/17/20 0834  •  folic acid (FOLVITE) tablet 1 mg, 1 mg, Oral, Daily, Dru Valles MD, 1 mg at 07/17/20 0834  •  HYDROcodone-acetaminophen (NORCO) 5-325 MG per tablet 1 tablet, 1 tablet, Oral, Q6H PRN, Dru Valles MD  •  levothyroxine (SYNTHROID, LEVOTHROID) tablet 88 mcg, 88 mcg, Oral, Daily, Dru Valles MD, 88 mcg at 07/17/20 0834  •  morphine injection 4 mg, 4 mg, Intravenous, Q4H PRN, Dru Valles MD, 4 mg at 07/17/20 1425  •  ondansetron (ZOFRAN) injection 4 mg, 4 mg, Intravenous, Q6H PRN, Dru Valles,  MD  •  Pharmacy to Dose enoxaparin (LOVENOX), , Does not apply, Continuous PRN, Dru Valles MD  •  Pharmacy to dose warfarin, , Does not apply, Continuous PRN, Dru Valles MD  •  predniSONE (DELTASONE) tablet 5 mg, 5 mg, Oral, Every Other Day, Dru Valles MD, 5 mg at 07/17/20 0834  •  sodium chloride 0.9 % flush 10 mL, 10 mL, Intravenous, Q12H, Dru Valles MD, 10 mL at 07/17/20 1022  •  sodium chloride 0.9 % flush 10 mL, 10 mL, Intravenous, PRN, Dru Valles MD  •  warfarin (COUMADIN) tablet 10 mg, 10 mg, Oral, Daily, Dru Valles MD, 10 mg at 07/17/20 1726    I have reviewed the patient's current medications.     Results Review:  I have reviewed the labs, radiology results, and diagnostic studies.    Laboratory Data:   Results from last 7 days   Lab Units 07/17/20  0855 07/16/20 2006   SODIUM mmol/L 130* 136   POTASSIUM mmol/L 3.8 4.3   CHLORIDE mmol/L 95* 102   CO2 mmol/L 21.0* 22.0   BUN mg/dL 15 10   CREATININE mg/dL 0.88 0.76   GLUCOSE mg/dL 117* 107*   CALCIUM mg/dL 9.0 9.5   BILIRUBIN mg/dL  --  0.9   ALK PHOS U/L  --  122*   ALT (SGPT) U/L  --  17   AST (SGOT) U/L  --  18   ANION GAP mmol/L 14.0 12.0     Estimated Creatinine Clearance: 101.9 mL/min (by C-G formula based on SCr of 0.88 mg/dL).  Results from last 7 days   Lab Units 07/16/20 2006   MAGNESIUM mg/dL 1.9         Results from last 7 days   Lab Units 07/17/20  0855 07/16/20 2006   WBC 10*3/mm3 9.09 9.33   HEMOGLOBIN g/dL 13.1 14.0   HEMATOCRIT % 37.8 40.8   PLATELETS 10*3/mm3 239 229     Results from last 7 days   Lab Units 07/17/20  1423 07/16/20 2006   INR  1.02 0.88       Culture Data:   No results found for: BLOODCX  No results found for: URINECX  No results found for: RESPCX  No results found for: WOUNDCX  No results found for: STOOLCX  No components found for: BODYFLD    Radiology Data:   Imaging Results (Last 24 Hours)     Procedure Component Value Units Date/Time    US Venous Doppler Lower  Extremity Bilateral (duplex) [[id number]] Collected:  07/17/20 0723     Updated:  07/17/20 0819    Narrative:       PROCEDURE: US LOWER EXTREMITY VEINS BILATERAL    CLINICAL HISTORY:   Shortness of breath and chest pain    INDICATION: Same as above.    COMPARISON: None .    TECHNIQUE:     Gray scale imaging with duplex interrogation of the right and  left lower extremity venous system was performed and multiple  static images were obtained.     FINDINGS:     Utilizing compression and augmentation, there is no deep venous  thrombus in the common femoral, femoral, profunda femoris or  popliteal veins.     The peroneal and the posterior tibial veins are patent and  compressible.      The bilateral greater saphenous veins at their respective  saphenofemoral junctions are patent and compressible.    The imaged portions of the remainder of the bilateral greater  saphenous veins are patent.    There is a Baker's cyst in the left popliteal fossa measuring 3.2  x 1.5 x 2.6 cm      Impression:         There is no acute deep venous thrombosis in the imaged deep veins  of the bilateral lower extremities.     Electronically signed by:  Manuel Barron MD  7/17/2020 8:18 AM CDT  Workstation: [id number]          Assessment/Plan     Hospital Problem List:  Active Problems:    Acute pulmonary embolism (CMS/HCC)    Pulmonary emboli (CMS/HCC)  Recent ehrlichiosis infection  Morbid obesity  Hypothyroidism  History of rheumatoid arthritis     Plan  - Patient has 3-day history of chest pain with chest x-ray and CT scanning at the urgent care showing an infiltrate in the left upper lobe along with left lung segmental pulmonary embolism.  -She has no leukocytosis and was febrile on admission however COVID-19 test was negative..  -Urine Legionella and strep pneumo antigens negative.  Follow-up blood cultures.  -Continue Lovenox treatment dose with pharmacy to dose.  Will bridge to Coumadin as patient is morbidly obese and DOACS not first  line agents.  -Patient will need 72 hours of overlap therapy prior to discharge.  -Echocardiogram showed normal ejection fraction and was unremarkable.  Doppler ultrasound of legs was negative.  -Pain control  -Continue p.o. doxycycline for recent ehrlichiosis infection  -DVT prophylaxis with subcutaneous Lovenox and bridged to Coumadin  -CODE STATUS is full code     Discharge Planning: In progress    Dru Valles MD   07/17/20   20:41

## 2020-07-18 NOTE — PLAN OF CARE
No acute changes, pt still continues to run a low grade fever, IV antibiotics started, pain controlled with morphine, educated on using incentive spirometer, turning, coughing, deep breathing; will continue to monitor.

## 2020-07-18 NOTE — PROGRESS NOTES
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  Patient is also receiving enoxaparin    Home regimen: New start  INR Goal: 2-3    Last INR:   Lab Results   Component Value Date    INR 1.12 07/18/2020       Objective:  [Ht: 163 cm (64.17\"); Wt: 126 kg (277 lb 12.5 oz)]  Lab Results   Component Value Date    INR 1.12 07/18/2020    INR 1.02 07/17/2020    INR 0.88 07/16/2020    PROTIME 14.9 07/18/2020    PROTIME 13.8 07/17/2020    PROTIME 12.3 07/16/2020     Lab Results   Component Value Date    HGB 12.3 07/18/2020    HGB 13.1 07/17/2020    HGB 14.0 07/16/2020    HCT 36.6 07/18/2020    HCT 37.8 07/17/2020    HCT 40.8 07/16/2020     07/18/2020     07/17/2020     07/16/2020       Recent Warfarin Administrations       The 5 most recent administrations since 07/11/2020 are shown below each listed medication.    Warfarin Sodium         Order Dose Date Given     warfarin (COUMADIN) tablet 10 mg 10 mg 07/17/2020                      Assessment  Interacting medications: lovenox, levothyroxine, doxycycline  INR is 1.12, subtherapeutic, due to new start warfarin, trending upwards slowly.  Continue lovenox bridge until INR greater than 2.  Will continue to trend warfarin 10 mg nightly at this time, may reduce to warfarin 5 mg nightly if INR trends upwards quickly.  H/H WNL      Plan:  1.  Give warfarin 10 mg tablet PO @ 1800 tonight  2.  Draw a PT/INR in AM  3.  Pharmacy will continue to follow    Fish De Luna [facility]  07/18/20 15:06     "

## 2020-07-19 LAB
ANION GAP SERPL CALCULATED.3IONS-SCNC: 10 MMOL/L (ref 5–15)
BASOPHILS # BLD AUTO: 0.04 10*3/MM3 (ref 0–0.2)
BASOPHILS NFR BLD AUTO: 0.5 % (ref 0–1.5)
BUN SERPL-MCNC: 8 MG/DL (ref 6–20)
BUN/CREAT SERPL: 10.3 (ref 7–25)
CALCIUM SPEC-SCNC: 9.3 MG/DL (ref 8.6–10.5)
CHLORIDE SERPL-SCNC: 94 MMOL/L (ref 98–107)
CO2 SERPL-SCNC: 24 MMOL/L (ref 22–29)
CREAT SERPL-MCNC: 0.78 MG/DL (ref 0.57–1)
DEPRECATED RDW RBC AUTO: 44.8 FL (ref 37–54)
EOSINOPHIL # BLD AUTO: 0.05 10*3/MM3 (ref 0–0.4)
EOSINOPHIL NFR BLD AUTO: 0.6 % (ref 0.3–6.2)
ERYTHROCYTE [DISTWIDTH] IN BLOOD BY AUTOMATED COUNT: 14.5 % (ref 12.3–15.4)
GFR SERPL CREATININE-BSD FRML MDRD: 78 ML/MIN/1.73
GLUCOSE SERPL-MCNC: 103 MG/DL (ref 65–99)
HCT VFR BLD AUTO: 33.5 % (ref 34–46.6)
HGB BLD-MCNC: 11.2 G/DL (ref 12–15.9)
IMM GRANULOCYTES # BLD AUTO: 0.04 10*3/MM3 (ref 0–0.05)
IMM GRANULOCYTES NFR BLD AUTO: 0.5 % (ref 0–0.5)
INR PPP: 1.15 (ref 0.8–1.2)
LYMPHOCYTES # BLD AUTO: 2.3 10*3/MM3 (ref 0.7–3.1)
LYMPHOCYTES NFR BLD AUTO: 26.6 % (ref 19.6–45.3)
MCH RBC QN AUTO: 28.2 PG (ref 26.6–33)
MCHC RBC AUTO-ENTMCNC: 33.4 G/DL (ref 31.5–35.7)
MCV RBC AUTO: 84.4 FL (ref 79–97)
MONOCYTES # BLD AUTO: 0.81 10*3/MM3 (ref 0.1–0.9)
MONOCYTES NFR BLD AUTO: 9.4 % (ref 5–12)
NEUTROPHILS NFR BLD AUTO: 5.4 10*3/MM3 (ref 1.7–7)
NEUTROPHILS NFR BLD AUTO: 62.4 % (ref 42.7–76)
NRBC BLD AUTO-RTO: 0 /100 WBC (ref 0–0.2)
PLATELET # BLD AUTO: 242 10*3/MM3 (ref 140–450)
PMV BLD AUTO: 9.9 FL (ref 6–12)
POTASSIUM SERPL-SCNC: 3.8 MMOL/L (ref 3.5–5.2)
PROTHROMBIN TIME: 15.2 SECONDS (ref 11.1–15.3)
RBC # BLD AUTO: 3.97 10*6/MM3 (ref 3.77–5.28)
SODIUM SERPL-SCNC: 128 MMOL/L (ref 136–145)
WBC # BLD AUTO: 8.64 10*3/MM3 (ref 3.4–10.8)

## 2020-07-19 PROCEDURE — 25010000002 CEFTRIAXONE PER 250 MG: Performed by: INTERNAL MEDICINE

## 2020-07-19 PROCEDURE — 85025 COMPLETE CBC W/AUTO DIFF WBC: CPT | Performed by: INTERNAL MEDICINE

## 2020-07-19 PROCEDURE — 63710000001 PREDNISONE PER 5 MG: Performed by: INTERNAL MEDICINE

## 2020-07-19 PROCEDURE — 25010000002 ENOXAPARIN PER 10 MG: Performed by: INTERNAL MEDICINE

## 2020-07-19 PROCEDURE — 25010000002 MORPHINE PER 10 MG: Performed by: INTERNAL MEDICINE

## 2020-07-19 PROCEDURE — 85610 PROTHROMBIN TIME: CPT | Performed by: INTERNAL MEDICINE

## 2020-07-19 PROCEDURE — 80048 BASIC METABOLIC PNL TOTAL CA: CPT | Performed by: INTERNAL MEDICINE

## 2020-07-19 RX ORDER — MORPHINE SULFATE 15 MG/1
15 TABLET ORAL EVERY 4 HOURS PRN
Status: DISCONTINUED | OUTPATIENT
Start: 2020-07-19 | End: 2020-07-23 | Stop reason: HOSPADM

## 2020-07-19 RX ADMIN — PREDNISONE 5 MG: 5 TABLET ORAL at 07:14

## 2020-07-19 RX ADMIN — DOXYCYCLINE 100 MG: 100 CAPSULE ORAL at 20:10

## 2020-07-19 RX ADMIN — MORPHINE SULFATE 4 MG: 4 INJECTION, SOLUTION INTRAMUSCULAR; INTRAVENOUS at 16:37

## 2020-07-19 RX ADMIN — ACETAMINOPHEN 650 MG: 325 TABLET, FILM COATED ORAL at 20:15

## 2020-07-19 RX ADMIN — MORPHINE SULFATE 4 MG: 4 INJECTION, SOLUTION INTRAMUSCULAR; INTRAVENOUS at 03:21

## 2020-07-19 RX ADMIN — MORPHINE SULFATE 4 MG: 4 INJECTION, SOLUTION INTRAMUSCULAR; INTRAVENOUS at 07:15

## 2020-07-19 RX ADMIN — SODIUM CHLORIDE, PRESERVATIVE FREE 10 ML: 5 INJECTION INTRAVENOUS at 20:10

## 2020-07-19 RX ADMIN — CEFTRIAXONE SODIUM 2 G: 2 INJECTION, POWDER, FOR SOLUTION INTRAMUSCULAR; INTRAVENOUS at 09:23

## 2020-07-19 RX ADMIN — LEVOTHYROXINE SODIUM 88 MCG: 88 TABLET ORAL at 07:14

## 2020-07-19 RX ADMIN — MORPHINE SULFATE 4 MG: 4 INJECTION, SOLUTION INTRAMUSCULAR; INTRAVENOUS at 22:32

## 2020-07-19 RX ADMIN — WARFARIN SODIUM 10 MG: 10 TABLET ORAL at 16:37

## 2020-07-19 RX ADMIN — DOXYCYCLINE 100 MG: 100 CAPSULE ORAL at 07:14

## 2020-07-19 RX ADMIN — MORPHINE SULFATE 4 MG: 4 INJECTION, SOLUTION INTRAMUSCULAR; INTRAVENOUS at 12:21

## 2020-07-19 RX ADMIN — FOLIC ACID 1 MG: 1 TABLET ORAL at 07:14

## 2020-07-19 RX ADMIN — SODIUM CHLORIDE, PRESERVATIVE FREE 10 ML: 5 INJECTION INTRAVENOUS at 07:15

## 2020-07-19 RX ADMIN — ENOXAPARIN SODIUM 130 MG: 150 INJECTION SUBCUTANEOUS at 07:15

## 2020-07-19 RX ADMIN — ENOXAPARIN SODIUM 130 MG: 150 INJECTION SUBCUTANEOUS at 20:10

## 2020-07-19 NOTE — PROGRESS NOTES
[facility] Medicine Services  INPATIENT PROGRESS NOTE    Length of Stay: 2  Date of Admission: 7/16/2020  Primary Care Physician: Jaevd Thomson MD    Subjective   Chief Complaint: Chest pain    HPI: Patient presented with chest pain, pleuritic in nature of 3 days duration. She is being monitored for pulmonary embolism. Today she states that the chest pain is slightly improved. She is also continued treatment for ehrlichiosis which she recently had. Patient has no further fevers overnight.  She states that her shortness of breath is improving along with her chest pain. She is able to breathe deeper today.    Review of Systems  Constitutional: Positive for fatigue. Negative for  fever, appetite change, chills and fever.   HENT: Negative for congestion and sore throat.    Eyes: Negative for pain and redness.   Respiratory: Positive for shortness of breath. Negative for cough, chest tightness and wheezing.    Cardiovascular: Positive for chest pain. Negative for palpitations and leg swelling.   Gastrointestinal: Negative for abdominal pain, constipation, diarrhea, nausea and vomiting.   Genitourinary: Negative for dysuria and hematuria.   Musculoskeletal: Negative for arthralgias, joint swelling and neck pain.   Skin: Negative for color change and rash.   Neurological: Negative for dizziness, syncope, light-headedness and headaches.   Hematological: Negative for adenopathy.   Psychiatric/Behavioral: Negative for agitation and confusion. The patient is not nervous/anxious    Objective    Temp:  [97.3 °F (36.3 °C)-99.9 °F (37.7 °C)] 99.1 °F (37.3 °C)  Heart Rate:  [] 109  Resp:  [18-20] 20  BP: (123-158)/(63-80) 126/80    Physical Exam  Constitutional: She is oriented to person, place, and time. No distress.   Morbidly obese   HENT:   Head: Normocephalic and atraumatic.   Mouth/Throat: Oropharynx is clear and moist. No oropharyngeal exudate.   Eyes: Pupils are equal,  round, and reactive to light. Conjunctivae and EOM are normal. No scleral icterus.   Neck: Normal range of motion. Neck supple. No JVD present. No tracheal deviation present. No thyromegaly present.   Cardiovascular: Normal rate, regular rhythm, normal heart sounds and intact distal pulses. Exam reveals no gallop and no friction rub.   No murmur heard.  Pulmonary/Chest: Effort normal .  Reduced breath sounds globally. No stridor. No respiratory distress. She has no wheezes. She has no rales. She exhibits no tenderness.   Abdominal: Soft. Bowel sounds are normal. She exhibits no distension and no mass. There is no tenderness. There is no rebound and no guarding. No hernia.   Musculoskeletal: Normal range of motion. She exhibits no edema, tenderness or deformity.   Lymphadenopathy:     She has no cervical adenopathy.   Neurological: She is alert and oriented to person, place, and time. No cranial nerve deficit. She exhibits normal muscle tone.   Skin: Skin is warm and dry. No rash noted. She is not diaphoretic. No erythema. No pallor.   Psychiatric: She has a normal mood and affect. Her behavior is normal. Judgment and thought content normal.     Medication Review:    Current Facility-Administered Medications:   •  acetaminophen (TYLENOL) tablet 650 mg, 650 mg, Oral, Q4H PRN, Dru Valles MD, 650 mg at 07/18/20 2052  •  cefTRIAXone (ROCEPHIN) 2 g/100 mL 0.9% NS VTB (HEIDY), 2 g, Intravenous, Q24H, Dru Valles MD, 2 g at 07/19/20 0923  •  doxycycline (MONODOX) capsule 100 mg, 100 mg, Oral, Q12H, Dru Valles MD, 100 mg at 07/19/20 0714  •  enoxaparin (LOVENOX) injection 130 mg, 1 mg/kg, Subcutaneous, Q12H, Dru Valles MD, 130 mg at 07/19/20 0715  •  folic acid (FOLVITE) tablet 1 mg, 1 mg, Oral, Daily, Dru Valles MD, 1 mg at 07/19/20 0714  •  HYDROcodone-acetaminophen (NORCO) 5-325 MG per tablet 1 tablet, 1 tablet, Oral, Q6H PRN, Dru Valles MD  •  levothyroxine (SYNTHROID,  LEVOTHROID) tablet 88 mcg, 88 mcg, Oral, Daily, Dru Valles MD, 88 mcg at 07/19/20 0714  •  Morphine (MSIR) tablet 15 mg, 15 mg, Oral, Q4H PRN, Dru Valles MD  •  morphine injection 4 mg, 4 mg, Intravenous, Q4H PRN, Dru Valles MD, 4 mg at 07/19/20 1221  •  ondansetron (ZOFRAN) injection 4 mg, 4 mg, Intravenous, Q6H PRN, Dru Valles MD  •  Pharmacy to Dose enoxaparin (LOVENOX), , Does not apply, Continuous PRN, Dru Valles MD  •  Pharmacy to dose warfarin, , Does not apply, Continuous PRN, Dru Valles MD  •  predniSONE (DELTASONE) tablet 5 mg, 5 mg, Oral, Every Other Day, Dru Valles MD, 5 mg at 07/19/20 0714  •  sodium chloride 0.9 % flush 10 mL, 10 mL, Intravenous, Q12H, Dru Valles MD, 10 mL at 07/19/20 0715  •  sodium chloride 0.9 % flush 10 mL, 10 mL, Intravenous, PRN, Dru Valles MD, 10 mL at 07/18/20 1809  •  warfarin (COUMADIN) tablet 10 mg, 10 mg, Oral, Daily, Dru Valles MD, 10 mg at 07/18/20 1644    I have reviewed the patient's current medications.     Results Review:  I have reviewed the labs, radiology results, and diagnostic studies.    Laboratory Data:   Results from last 7 days   Lab Units 07/19/20  0646 07/18/20  0546 07/17/20  0855 07/16/20 2006   SODIUM mmol/L 128* 128* 130* 136   POTASSIUM mmol/L 3.8 3.7 3.8 4.3   CHLORIDE mmol/L 94* 94* 95* 102   CO2 mmol/L 24.0 22.0 21.0* 22.0   BUN mg/dL 8 11 15 10   CREATININE mg/dL 0.78 0.85 0.88 0.76   GLUCOSE mg/dL 103* 105* 117* 107*   CALCIUM mg/dL 9.3 9.4 9.0 9.5   BILIRUBIN mg/dL  --   --   --  0.9   ALK PHOS U/L  --   --   --  122*   ALT (SGPT) U/L  --   --   --  17   AST (SGOT) U/L  --   --   --  18   ANION GAP mmol/L 10.0 12.0 14.0 12.0     Estimated Creatinine Clearance: 112.3 mL/min (by C-G formula based on SCr of 0.78 mg/dL).  Results from last 7 days   Lab Units 07/16/20 2006   MAGNESIUM mg/dL 1.9         Results from last 7 days   Lab Units 07/19/20  0646  07/18/20  0545 07/17/20  0855 07/16/20 2006   WBC 10*3/mm3 8.64 10.40 9.09 9.33   HEMOGLOBIN g/dL 11.2* 12.3 13.1 14.0   HEMATOCRIT % 33.5* 36.6 37.8 40.8   PLATELETS 10*3/mm3 242 249 239 229     Results from last 7 days   Lab Units 07/19/20  0646 07/18/20  0546 07/17/20  1423 07/16/20 2006   INR  1.15 1.12 1.02 0.88       Culture Data:   Blood Culture   Date Value Ref Range Status   07/16/2020 No growth at 2 days  Preliminary   07/16/2020 No growth at 2 days  Preliminary     No results found for: URINECX  No results found for: RESPCX  No results found for: WOUNDCX  No results found for: STOOLCX  No components found for: BODYFLD    Radiology Data:   Imaging Results (Last 24 Hours)     ** No results found for the last 24 hours. **          Assessment/Plan     Hospital Problem List:  Active Problems:    Acute pulmonary embolism (CMS/HCC)    Pulmonary emboli (CMS/HCC)  Left lower lobe pneumonia  Acute respiratory failure with hypoxia  Recent ehrlichiosis infection  Morbid obesity  Hypothyroidism  History of rheumatoid arthritis     Plan  -Patient has 3-day history of chest pain with chest x-ray and CT scanning at the urgent care showing an infiltrate in the left upper lobe along with left lung segmental pulmonary embolism.  - COVID-19 test was negative.  However patient was started on IV antibiotics with ceftriaxone for pneumonia.  -Continue Incentive spirometry  -Urine Legionella and strep pneumo antigens negative.  Follow-up blood cultures.  -Continue Lovenox treatment dose with pharmacy to dose. Will bridge to Coumadin as patient is morbidly obese and DOACS not first line agents.  -Patient will need at least 5 days of overlap therapy prior to discharge or until INR is therapeutic for 24 hours.  -Continue oxygen by nasal cannula to keep O2 sat greater than 92%  -Echocardiogram showed normal ejection fraction and was unremarkable.  Doppler ultrasound of legs was negative.  -Pain control.  Will start p.o. morphine  immediate release for moderate to severe pain.  Use IV morphine PRN for severe breakthrough pain unresponsive to IV morphine.  -Continue p.o. doxycycline for recent ehrlichiosis infection  -DVT prophylaxis with subcutaneous Lovenox and bridged to Coumadin  -CODE STATUS is full code     Discharge Planning: In progress    Dru Valles MD   07/19/20   15:43

## 2020-07-19 NOTE — PROGRESS NOTES
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  Patient is also receiving enoxaparin     Home regimen: New start  INR Goal: 2-3    Last INR:   Lab Results   Component Value Date    INR 1.15 07/19/2020       Objective:  [Ht: 163 cm (64.17\"); Wt: 121 kg (267 lb 6.4 oz)]  Lab Results   Component Value Date    INR 1.15 07/19/2020    INR 1.12 07/18/2020    INR 1.02 07/17/2020    PROTIME 15.2 07/19/2020    PROTIME 14.9 07/18/2020    PROTIME 13.8 07/17/2020     Lab Results   Component Value Date    HGB 11.2 (L) 07/19/2020    HGB 12.3 07/18/2020    HGB 13.1 07/17/2020    HCT 33.5 (L) 07/19/2020    HCT 36.6 07/18/2020    HCT 37.8 07/17/2020     07/19/2020     07/18/2020     07/17/2020       Recent Warfarin Administrations       The 5 most recent administrations since 07/12/2020 are shown below each listed medication.    Warfarin Sodium         Order Dose Date Given     warfarin (COUMADIN) tablet 10 mg 10 mg 07/18/2020      10 mg 07/17/2020                      Assessment  Interacting medications: lovenox, levothyroxine, doxycycline  INR is 1.15, subtherapeutic, due to new start warfarin, trending upwards slowly.  Continue lovenox bridge for 72 hours after INR reaches 2 per notes.  Will continue to trend warfarin 10 mg nightly at this time.  H/H trended down slightly today        Plan:  1.  Give warfarin 10 mg tablet PO @ 1800 tonight  2.  Draw a PT/INR in AM  3.  Pharmacy will continue to follow    Fish De Luna [facility]  07/19/20 09:40     "

## 2020-07-19 NOTE — PLAN OF CARE
"  Problem: Patient Care Overview  Goal: Plan of Care Review  Outcome: Ongoing (interventions implemented as appropriate)  Flowsheets (Taken 7/19/2020 5585)  Progress: no change  Plan of Care Reviewed With: patient  Outcome Summary: Continues low grade fever, Tylenol given, Vitals good, Complains of \"Lung\" pain, Morphine relieves     "

## 2020-07-20 LAB
ANION GAP SERPL CALCULATED.3IONS-SCNC: 10 MMOL/L (ref 5–15)
BASOPHILS # BLD AUTO: 0.04 10*3/MM3 (ref 0–0.2)
BASOPHILS NFR BLD AUTO: 0.5 % (ref 0–1.5)
BUN SERPL-MCNC: 9 MG/DL (ref 6–20)
BUN/CREAT SERPL: 12 (ref 7–25)
CALCIUM SPEC-SCNC: 8.9 MG/DL (ref 8.6–10.5)
CHLORIDE SERPL-SCNC: 96 MMOL/L (ref 98–107)
CO2 SERPL-SCNC: 26 MMOL/L (ref 22–29)
CREAT SERPL-MCNC: 0.75 MG/DL (ref 0.57–1)
DEPRECATED RDW RBC AUTO: 45.1 FL (ref 37–54)
EOSINOPHIL # BLD AUTO: 0.11 10*3/MM3 (ref 0–0.4)
EOSINOPHIL NFR BLD AUTO: 1.5 % (ref 0.3–6.2)
ERYTHROCYTE [DISTWIDTH] IN BLOOD BY AUTOMATED COUNT: 14.6 % (ref 12.3–15.4)
GFR SERPL CREATININE-BSD FRML MDRD: 82 ML/MIN/1.73
GLUCOSE SERPL-MCNC: 102 MG/DL (ref 65–99)
HCT VFR BLD AUTO: 32.4 % (ref 34–46.6)
HGB BLD-MCNC: 10.8 G/DL (ref 12–15.9)
IMM GRANULOCYTES # BLD AUTO: 0.04 10*3/MM3 (ref 0–0.05)
IMM GRANULOCYTES NFR BLD AUTO: 0.5 % (ref 0–0.5)
INR PPP: 1.21 (ref 0.8–1.2)
LYMPHOCYTES # BLD AUTO: 2.29 10*3/MM3 (ref 0.7–3.1)
LYMPHOCYTES NFR BLD AUTO: 30.7 % (ref 19.6–45.3)
MCH RBC QN AUTO: 28.2 PG (ref 26.6–33)
MCHC RBC AUTO-ENTMCNC: 33.3 G/DL (ref 31.5–35.7)
MCV RBC AUTO: 84.6 FL (ref 79–97)
MONOCYTES # BLD AUTO: 0.62 10*3/MM3 (ref 0.1–0.9)
MONOCYTES NFR BLD AUTO: 8.3 % (ref 5–12)
NEUTROPHILS NFR BLD AUTO: 4.36 10*3/MM3 (ref 1.7–7)
NEUTROPHILS NFR BLD AUTO: 58.5 % (ref 42.7–76)
NRBC BLD AUTO-RTO: 0 /100 WBC (ref 0–0.2)
PLATELET # BLD AUTO: 255 10*3/MM3 (ref 140–450)
PMV BLD AUTO: 10.8 FL (ref 6–12)
POTASSIUM SERPL-SCNC: 3.7 MMOL/L (ref 3.5–5.2)
PROTHROMBIN TIME: 15.9 SECONDS (ref 11.1–15.3)
RBC # BLD AUTO: 3.83 10*6/MM3 (ref 3.77–5.28)
SODIUM SERPL-SCNC: 132 MMOL/L (ref 136–145)
WBC # BLD AUTO: 7.46 10*3/MM3 (ref 3.4–10.8)

## 2020-07-20 PROCEDURE — 25010000002 ENOXAPARIN PER 10 MG: Performed by: INTERNAL MEDICINE

## 2020-07-20 PROCEDURE — 85610 PROTHROMBIN TIME: CPT | Performed by: INTERNAL MEDICINE

## 2020-07-20 PROCEDURE — 25010000002 MORPHINE PER 10 MG: Performed by: INTERNAL MEDICINE

## 2020-07-20 PROCEDURE — 80048 BASIC METABOLIC PNL TOTAL CA: CPT | Performed by: INTERNAL MEDICINE

## 2020-07-20 PROCEDURE — 25010000002 CEFTRIAXONE PER 250 MG: Performed by: INTERNAL MEDICINE

## 2020-07-20 PROCEDURE — 85025 COMPLETE CBC W/AUTO DIFF WBC: CPT | Performed by: INTERNAL MEDICINE

## 2020-07-20 RX ADMIN — MORPHINE SULFATE 15 MG: 15 TABLET ORAL at 22:18

## 2020-07-20 RX ADMIN — DOXYCYCLINE 100 MG: 100 CAPSULE ORAL at 08:01

## 2020-07-20 RX ADMIN — ENOXAPARIN SODIUM 130 MG: 150 INJECTION SUBCUTANEOUS at 08:01

## 2020-07-20 RX ADMIN — WARFARIN SODIUM 10 MG: 10 TABLET ORAL at 18:04

## 2020-07-20 RX ADMIN — MORPHINE SULFATE 15 MG: 15 TABLET ORAL at 08:01

## 2020-07-20 RX ADMIN — SODIUM CHLORIDE, PRESERVATIVE FREE 10 ML: 5 INJECTION INTRAVENOUS at 08:03

## 2020-07-20 RX ADMIN — ENOXAPARIN SODIUM 120 MG: 120 INJECTION SUBCUTANEOUS at 20:30

## 2020-07-20 RX ADMIN — SODIUM CHLORIDE, PRESERVATIVE FREE 10 ML: 5 INJECTION INTRAVENOUS at 20:30

## 2020-07-20 RX ADMIN — MORPHINE SULFATE 4 MG: 4 INJECTION, SOLUTION INTRAMUSCULAR; INTRAVENOUS at 03:25

## 2020-07-20 RX ADMIN — CEFTRIAXONE SODIUM 2 G: 2 INJECTION, POWDER, FOR SOLUTION INTRAMUSCULAR; INTRAVENOUS at 10:57

## 2020-07-20 RX ADMIN — MORPHINE SULFATE 15 MG: 15 TABLET ORAL at 13:53

## 2020-07-20 RX ADMIN — LEVOTHYROXINE SODIUM 88 MCG: 88 TABLET ORAL at 08:01

## 2020-07-20 RX ADMIN — MORPHINE SULFATE 15 MG: 15 TABLET ORAL at 18:18

## 2020-07-20 RX ADMIN — FOLIC ACID 1 MG: 1 TABLET ORAL at 08:01

## 2020-07-20 NOTE — PROGRESS NOTES
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  Patient is also receiving enoxaparin     Home regimen: New start  INR Goal: 2-3    Last INR:   Lab Results   Component Value Date    INR 1.21 (H) 07/20/2020       Objective:  [Ht: 163 cm (64.17\"); Wt: 118 kg (259 lb 12.8 oz)]  Lab Results   Component Value Date    INR 1.21 (H) 07/20/2020    INR 1.15 07/19/2020    INR 1.12 07/18/2020    PROTIME 15.9 (H) 07/20/2020    PROTIME 15.2 07/19/2020    PROTIME 14.9 07/18/2020     Lab Results   Component Value Date    HGB 10.8 (L) 07/20/2020    HGB 11.2 (L) 07/19/2020    HGB 12.3 07/18/2020    HCT 32.4 (L) 07/20/2020    HCT 33.5 (L) 07/19/2020    HCT 36.6 07/18/2020     07/20/2020     07/19/2020     07/18/2020       Recent Warfarin Administrations       The 5 most recent administrations since 07/12/2020 are shown below each listed medication.    Warfarin Sodium         Order Dose Date Given     warfarin (COUMADIN) tablet 10 mg 10 mg 07/18/2020      10 mg 07/17/2020                      Assessment  Interacting medications: lovenox, levothyroxine, doxycycline  INR is subtherapeutic, due to new start warfarin, trending upwards slowly - if INR does not increase more tomorrow I will increase dose then  Will continue to trend warfarin 10 mg nightly at this time.  H/H trended down again today  Dose adjusted for Lovenox based on the patient's new weight     Plan:  1.  Give warfarin 10 mg tablet PO @ 1800 tonight  2.  Draw a PT/INR in AM  3.  Pharmacy will continue to follow    Bienvenido Giron RPH  07/20/20 13:41     "

## 2020-07-20 NOTE — PLAN OF CARE
"  Problem: Patient Care Overview  Goal: Plan of Care Review  Outcome: Ongoing (interventions implemented as appropriate)  Flowsheets  Taken 7/20/2020 0626  Progress: no change  Plan of Care Reviewed With: patient  Taken 7/19/2020 2429  Outcome Summary: Continues low grade fever, Tylenol given, Vitals good, Complains of \"Lung\" pain, Morphine relieves     "

## 2020-07-20 NOTE — PROGRESS NOTES
[facility] Medicine Services  INPATIENT PROGRESS NOTE    Length of Stay: 3  Date of Admission: 7/16/2020  Primary Care Physician: Javed Thomson MD    Subjective   Chief Complaint: No new complaints.    HPI: Patient is seen for follow-up.  She is a 50-year-old female with hypothyroidism, morbid obesity who was admitted for acute pulmonary embolus and pneumonia.   She is doing better, less symptomatic, continue O2 saturation in the low 90s on room air.  Reports no new complaints.    Review of Systems   Constitutional: Positive for activity change and fatigue. Negative for appetite change, chills, diaphoresis and fever.   HENT: Negative for trouble swallowing and voice change.    Eyes: Negative for photophobia and visual disturbance.   Respiratory: Negative for cough, choking, chest tightness, shortness of breath, wheezing and stridor.    Cardiovascular: Negative for chest pain, palpitations and leg swelling.   Gastrointestinal: Negative for abdominal distention, abdominal pain, blood in stool, constipation, diarrhea, nausea and vomiting.   Endocrine: Negative for cold intolerance, heat intolerance, polydipsia, polyphagia and polyuria.   Genitourinary: Negative for decreased urine volume, difficulty urinating, dysuria, enuresis, flank pain, frequency, hematuria and urgency.   Musculoskeletal: Negative for arthralgias, gait problem, myalgias, neck pain and neck stiffness.   Skin: Negative for pallor, rash and wound.   Neurological: Negative for dizziness, tremors, seizures, syncope, facial asymmetry, speech difficulty, weakness, light-headedness, numbness and headaches.   Hematological: Does not bruise/bleed easily.   Psychiatric/Behavioral: Negative for agitation, behavioral problems and confusion.       Objective    Temp:  [97 °F (36.1 °C)-100.1 °F (37.8 °C)] 97 °F (36.1 °C)  Heart Rate:  [] 94  Resp:  [18-20] 18  BP: (112-138)/(61-90) 136/63    Physical Exam    Constitutional: She is oriented to person, place, and time. She appears well-developed and well-nourished. She is cooperative. No distress.   Patient is morbidly obese and has a BMI of 44.35   HENT:   Head: Normocephalic and atraumatic.   Right Ear: External ear normal.   Left Ear: External ear normal.   Nose: Nose normal.   Mouth/Throat: Oropharynx is clear and moist.   Eyes: Pupils are equal, round, and reactive to light. Conjunctivae and EOM are normal.   Neck: Normal range of motion. Neck supple. No JVD present. No thyromegaly present.   Cardiovascular: Normal rate, regular rhythm, normal heart sounds and intact distal pulses. Exam reveals no gallop and no friction rub.   No murmur heard.  Pulmonary/Chest: Effort normal and breath sounds normal. No stridor. No respiratory distress. She has no wheezes. She has no rales. She exhibits no tenderness.   She has distant breath sounds at the bases.   Abdominal: Soft. Bowel sounds are normal. She exhibits no distension and no mass. There is no tenderness. There is no rebound and no guarding. No hernia.   Musculoskeletal: Normal range of motion. She exhibits no edema, tenderness or deformity.   Neurological: She is alert and oriented to person, place, and time. She has normal reflexes.   Skin: Skin is warm and dry. No rash noted. She is not diaphoretic. No erythema. No pallor.   Psychiatric: She has a normal mood and affect. Her behavior is normal. Judgment and thought content normal.   Nursing note and vitals reviewed.        Medication Review:    Current Facility-Administered Medications:   •  acetaminophen (TYLENOL) tablet 650 mg, 650 mg, Oral, Q4H PRN, Dru Valles MD, 650 mg at 07/19/20 2015  •  cefTRIAXone (ROCEPHIN) 2 g/100 mL 0.9% NS VTB (HEIDY), 2 g, Intravenous, Q24H, Dru Valles MD, 2 g at 07/20/20 1057  •  enoxaparin (LOVENOX) injection 130 mg, 1 mg/kg, Subcutaneous, Q12H, Dru Valles MD, 130 mg at 07/20/20 0801  •  folic acid (FOLVITE)  tablet 1 mg, 1 mg, Oral, Daily, Dru Vlales MD, 1 mg at 07/20/20 0801  •  HYDROcodone-acetaminophen (NORCO) 5-325 MG per tablet 1 tablet, 1 tablet, Oral, Q6H PRN, Dru Valles MD  •  levothyroxine (SYNTHROID, LEVOTHROID) tablet 88 mcg, 88 mcg, Oral, Daily, Dru Valles MD, 88 mcg at 07/20/20 0801  •  Morphine (MSIR) tablet 15 mg, 15 mg, Oral, Q4H PRN, Dru Valles MD, 15 mg at 07/20/20 0801  •  morphine injection 4 mg, 4 mg, Intravenous, Q4H PRN, Dru Valles MD, 4 mg at 07/20/20 0325  •  ondansetron (ZOFRAN) injection 4 mg, 4 mg, Intravenous, Q6H PRN, Dru Valles MD  •  Pharmacy to Dose enoxaparin (LOVENOX), , Does not apply, Continuous PRN, Dru Valles MD  •  Pharmacy to dose warfarin, , Does not apply, Continuous PRN, Dru Valles MD  •  predniSONE (DELTASONE) tablet 5 mg, 5 mg, Oral, Every Other Day, Dru Valles MD, 5 mg at 07/19/20 0714  •  sodium chloride 0.9 % flush 10 mL, 10 mL, Intravenous, Q12H, Dru Valles MD, 10 mL at 07/20/20 0803  •  sodium chloride 0.9 % flush 10 mL, 10 mL, Intravenous, PRN, Dru Valles MD, 10 mL at 07/18/20 1809  •  warfarin (COUMADIN) tablet 10 mg, 10 mg, Oral, Daily, Dru Valles MD, 10 mg at 07/19/20 1637    Results Review:  I have reviewed the labs, radiology results, and diagnostic studies.    Laboratory Data:   Results from last 7 days   Lab Units 07/20/20  0532 07/19/20  0646 07/18/20  0546  07/16/20 2006   SODIUM mmol/L 132* 128* 128*   < > 136   POTASSIUM mmol/L 3.7 3.8 3.7   < > 4.3   CHLORIDE mmol/L 96* 94* 94*   < > 102   CO2 mmol/L 26.0 24.0 22.0   < > 22.0   BUN mg/dL 9 8 11   < > 10   CREATININE mg/dL 0.75 0.78 0.85   < > 0.76   GLUCOSE mg/dL 102* 103* 105*   < > 107*   CALCIUM mg/dL 8.9 9.3 9.4   < > 9.5   BILIRUBIN mg/dL  --   --   --   --  0.9   ALK PHOS U/L  --   --   --   --  122*   ALT (SGPT) U/L  --   --   --   --  17   AST (SGOT) U/L  --   --   --   --  18   ANION GAP  mmol/L 10.0 10.0 12.0   < > 12.0    < > = values in this interval not displayed.     Estimated Creatinine Clearance: 115 mL/min (by C-G formula based on SCr of 0.75 mg/dL).  Results from last 7 days   Lab Units 07/16/20 2006   MAGNESIUM mg/dL 1.9         Results from last 7 days   Lab Units 07/20/20  0532 07/19/20  0646 07/18/20  0545 07/17/20  0855 07/16/20 2006   WBC 10*3/mm3 7.46 8.64 10.40 9.09 9.33   HEMOGLOBIN g/dL 10.8* 11.2* 12.3 13.1 14.0   HEMATOCRIT % 32.4* 33.5* 36.6 37.8 40.8   PLATELETS 10*3/mm3 255 242 249 239 229     Results from last 7 days   Lab Units 07/20/20  0532 07/19/20  0646 07/18/20  0546 07/17/20  1423 07/16/20 2006   INR  1.21* 1.15 1.12 1.02 0.88       Culture Data:   No results found for: BLOODCX  No results found for: URINECX  No results found for: RESPCX  No results found for: WOUNDCX  No results found for: STOOLCX  No components found for: BODYFLD    Radiology Data:   Imaging Results (Last 24 Hours)     ** No results found for the last 24 hours. **          I have reviewed the patient's current medications.     Assessment/Plan     Hospital Problem List:  Active Problems:    Acute pulmonary embolism (CMS/HCC)    Pulmonary emboli (CMS/HCC)  Continue Lovenox with warfarin bridge.  INR is 1.21.  Duplex ultrasound both legs was negative for DVTs.  Echocardiogram showed:  · Estimated EF = 65%.  · Left ventricular systolic function is normal.  · Left ventricular diastolic dysfunction (grade I) consistent with impaired relaxation    Left lower lobe pneumonia: Continue IV antibiotics.  Strep and Legionella screens were unremarkable and blood cultures showed no growth.    Hypothyroidism: Continue Synthroid.    Hyponatremia: Improving.  Continue isotonic saline and restrict free water.    History of ehrlichiosis: Continue doxycycline.    Rheumatoid arthritis: Continue prednisone.    Morbid obesity: Diet and lifestyle modification have been discussed with patient.  Dietitian is  following.    Continue GI prophylaxis.    Discharge Planning: In progress.    Pradip Castrejon MD   07/20/20   13:42

## 2020-07-21 LAB
ANION GAP SERPL CALCULATED.3IONS-SCNC: 13 MMOL/L (ref 5–15)
BACTERIA SPEC AEROBE CULT: NORMAL
BACTERIA SPEC AEROBE CULT: NORMAL
BUN SERPL-MCNC: 5 MG/DL (ref 6–20)
BUN/CREAT SERPL: 6 (ref 7–25)
CALCIUM SPEC-SCNC: 8.8 MG/DL (ref 8.6–10.5)
CHLORIDE SERPL-SCNC: 97 MMOL/L (ref 98–107)
CO2 SERPL-SCNC: 23 MMOL/L (ref 22–29)
CREAT SERPL-MCNC: 0.83 MG/DL (ref 0.57–1)
GFR SERPL CREATININE-BSD FRML MDRD: 73 ML/MIN/1.73
GLUCOSE SERPL-MCNC: 144 MG/DL (ref 65–99)
INR PPP: 1.6 (ref 0.8–1.2)
POTASSIUM SERPL-SCNC: 3.5 MMOL/L (ref 3.5–5.2)
SODIUM SERPL-SCNC: 133 MMOL/L (ref 136–145)

## 2020-07-21 PROCEDURE — 80048 BASIC METABOLIC PNL TOTAL CA: CPT | Performed by: INTERNAL MEDICINE

## 2020-07-21 PROCEDURE — 85610 PROTHROMBIN TIME: CPT | Performed by: INTERNAL MEDICINE

## 2020-07-21 PROCEDURE — 63710000001 PREDNISONE PER 5 MG: Performed by: INTERNAL MEDICINE

## 2020-07-21 PROCEDURE — 25010000002 ENOXAPARIN PER 10 MG: Performed by: INTERNAL MEDICINE

## 2020-07-21 PROCEDURE — 25010000002 CEFTRIAXONE PER 250 MG: Performed by: INTERNAL MEDICINE

## 2020-07-21 RX ORDER — POTASSIUM CHLORIDE 750 MG/1
40 CAPSULE, EXTENDED RELEASE ORAL AS NEEDED
Status: DISCONTINUED | OUTPATIENT
Start: 2020-07-21 | End: 2020-07-23 | Stop reason: HOSPADM

## 2020-07-21 RX ORDER — POTASSIUM CHLORIDE 7.45 MG/ML
10 INJECTION INTRAVENOUS
Status: DISCONTINUED | OUTPATIENT
Start: 2020-07-21 | End: 2020-07-23 | Stop reason: HOSPADM

## 2020-07-21 RX ORDER — WARFARIN SODIUM 7.5 MG/1
7.5 TABLET ORAL
Status: DISCONTINUED | OUTPATIENT
Start: 2020-07-21 | End: 2020-07-22

## 2020-07-21 RX ORDER — POTASSIUM CHLORIDE 1.5 G/1.77G
40 POWDER, FOR SOLUTION ORAL AS NEEDED
Status: DISCONTINUED | OUTPATIENT
Start: 2020-07-21 | End: 2020-07-23 | Stop reason: HOSPADM

## 2020-07-21 RX ADMIN — CEFTRIAXONE SODIUM 2 G: 2 INJECTION, POWDER, FOR SOLUTION INTRAMUSCULAR; INTRAVENOUS at 09:45

## 2020-07-21 RX ADMIN — LEVOTHYROXINE SODIUM 88 MCG: 88 TABLET ORAL at 09:33

## 2020-07-21 RX ADMIN — SODIUM CHLORIDE, PRESERVATIVE FREE 10 ML: 5 INJECTION INTRAVENOUS at 09:34

## 2020-07-21 RX ADMIN — MORPHINE SULFATE 15 MG: 15 TABLET ORAL at 22:41

## 2020-07-21 RX ADMIN — ENOXAPARIN SODIUM 120 MG: 120 INJECTION SUBCUTANEOUS at 21:53

## 2020-07-21 RX ADMIN — WARFARIN SODIUM 7.5 MG: 7.5 TABLET ORAL at 17:24

## 2020-07-21 RX ADMIN — FOLIC ACID 1 MG: 1 TABLET ORAL at 09:33

## 2020-07-21 RX ADMIN — POTASSIUM CHLORIDE 40 MEQ: 10 CAPSULE, COATED, EXTENDED RELEASE ORAL at 22:35

## 2020-07-21 RX ADMIN — SODIUM CHLORIDE, PRESERVATIVE FREE 10 ML: 5 INJECTION INTRAVENOUS at 21:53

## 2020-07-21 RX ADMIN — PREDNISONE 5 MG: 5 TABLET ORAL at 09:33

## 2020-07-21 RX ADMIN — POTASSIUM CHLORIDE 40 MEQ: 10 CAPSULE, COATED, EXTENDED RELEASE ORAL at 16:34

## 2020-07-21 RX ADMIN — ENOXAPARIN SODIUM 120 MG: 120 INJECTION SUBCUTANEOUS at 09:33

## 2020-07-21 NOTE — PROGRESS NOTES
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  Patient is also receiving enoxaparin     Home regimen: New start  INR Goal: 2-3    Last INR:   Lab Results   Component Value Date    INR 1.60 (H) 07/21/2020       Objective:  [Ht: 163 cm (64.17\"); Wt: 118 kg (259 lb 12.8 oz)]  Lab Results   Component Value Date    INR 1.60 (H) 07/21/2020    INR 1.21 (H) 07/20/2020    INR 1.15 07/19/2020    PROTIME 15.9 (H) 07/20/2020    PROTIME 15.2 07/19/2020    PROTIME 14.9 07/18/2020     Lab Results   Component Value Date    HGB 10.8 (L) 07/20/2020    HGB 11.2 (L) 07/19/2020    HGB 12.3 07/18/2020    HCT 32.4 (L) 07/20/2020    HCT 33.5 (L) 07/19/2020    HCT 36.6 07/18/2020     07/20/2020     07/19/2020     07/18/2020       Recent Warfarin Administrations       The 5 most recent administrations since 07/12/2020 are shown below each listed medication.    Warfarin Sodium         Order Dose Date Given     warfarin (COUMADIN) tablet 10 mg 10 mg 07/18/2020      10 mg 07/17/2020                      Assessment  Interacting medications: lovenox, levothyroxine, prednisone  INR is subtherapeutic, but trending up at this time  Will continue to trend warfarin 10 mg nightly at this time.  No new h/h today  No s/s of bleeding noted  We will decrease dose to 7.5 mg PO warfarin nightly at this time as there was a significant increase overnight     Plan:  1.  Give warfarin 7.5 mg tablet PO @ 1800 tonight  2.  Draw a PT/INR in AM  3.  Pharmacy will continue to follow    Bienvenido Giron RPH  07/21/20 14:16     "

## 2020-07-21 NOTE — PLAN OF CARE
Problem: Patient Care Overview  Goal: Plan of Care Review  Outcome: Ongoing (interventions implemented as appropriate)  Flowsheets  Taken 7/21/2020 0028 by Elma Carcamo, RN  Progress: no change  Taken 7/21/2020 1100 by Anna Garza, RN  Plan of Care Reviewed With: patient  Taken 7/21/2020 1649 by Dipak Palacios, RN  Outcome Summary: pt vss. Pt resting at this time.

## 2020-07-21 NOTE — PLAN OF CARE
Problem: Patient Care Overview  Goal: Plan of Care Review  Outcome: Ongoing (interventions implemented as appropriate)  Flowsheets (Taken 7/21/2020 0028)  Progress: no change  Plan of Care Reviewed With: patient  Outcome Summary: Patient has complained of pain in her chest due to coughing; relieved with PRN pain meds. No other complaints. VSS, will continue to monitor.

## 2020-07-21 NOTE — CONSULTS
Adult Nutrition  Assessment    Patient Name:  Sid Cm  YOB: 1970  MRN: [id number]  Admit Date:  7/16/2020    Assessment Date:  7/21/2020    Comments:  Pt continues to be treated for PE and Lower Lobe Pneumonia.  She presents @ 216% of her IBW with a BMI of 44.35 which is compatible with morbid obesity.  RD provided education on Making Lifestyle Changes for A Healthy Weight.  Diet copy and contact number provided.      Reason for Assessment     Row Name 07/21/20 1455          Reason for Assessment    Reason For Assessment  follow-up protocol         Nutrition/Diet History     Row Name 07/21/20 1455          Nutrition/Diet History    Typical Food/Fluid Intake  Pt denies any Gi distress or difficulty with meals.             Labs/Tests/Procedures/Meds     Row Name 07/21/20 1457          Labs/Procedures/Meds    Lab Results Comments  Na 133; Gluc 144; Bun 5        Diagnostic Tests/Procedures    Diagnostic Test/Procedures Comments  Coumadin        Medications    Pertinent Medications Comments  Abx; Lovenox; Folic Acid; Prednisone; Coumadin         Physical Findings     Row Name 07/21/20 1456          Physical Findings    Overall Physical Appearance  on oxygen therapy;obese;overweight           Nutrition Prescription Ordered     Row Name 07/21/20 1457          Nutrition Prescription PO    Current PO Diet  Regular     Fluid Consistency  Thin         Evaluation of Received Nutrient/Fluid Intake     Row Name 07/21/20 1457          PO Evaluation    Number of Meals  6     % PO Intake  88% average               Electronically signed by:  Pham Ruelas RD  07/21/20 14:58

## 2020-07-21 NOTE — PROGRESS NOTES
[facility] Medicine Services  INPATIENT PROGRESS NOTE    Length of Stay: 4  Date of Admission: 7/16/2020  Primary Care Physician: Javed Thomson MD    Subjective   Chief Complaint: No new complaints.    HPI: Patient is seen for follow-up.  She is a 50-year-old female with hypothyroidism, morbid obesity who was admitted for acute pulmonary embolus and pneumonia.   She is doing better, less symptomatic and maintaining O2 saturation of 94 to 96% on room air.  She voices no new complaints.    Review of Systems   Constitutional: Positive for fatigue. Negative for activity change, appetite change, chills, diaphoresis and fever.   HENT: Negative for trouble swallowing and voice change.    Eyes: Negative for photophobia and visual disturbance.   Respiratory: Negative for cough, choking, chest tightness, shortness of breath, wheezing and stridor.    Cardiovascular: Negative for chest pain, palpitations and leg swelling.   Gastrointestinal: Negative for abdominal distention, abdominal pain, blood in stool, constipation, diarrhea, nausea and vomiting.   Endocrine: Negative for cold intolerance, heat intolerance, polydipsia, polyphagia and polyuria.   Genitourinary: Negative for decreased urine volume, difficulty urinating, dysuria, enuresis, flank pain, frequency, hematuria and urgency.   Musculoskeletal: Negative for arthralgias, gait problem, myalgias, neck pain and neck stiffness.   Skin: Negative for pallor, rash and wound.   Neurological: Negative for dizziness, tremors, seizures, syncope, facial asymmetry, speech difficulty, weakness, light-headedness, numbness and headaches.   Hematological: Does not bruise/bleed easily.   Psychiatric/Behavioral: Negative for agitation, behavioral problems and confusion.       Objective    Temp:  [97 °F (36.1 °C)-99.4 °F (37.4 °C)] 97.3 °F (36.3 °C)  Heart Rate:  [] 86  Resp:  [18] 18  BP: (106-136)/(63-76) 131/75    Physical Exam    Constitutional: She is oriented to person, place, and time. She appears well-developed and well-nourished. She is cooperative. No distress.   Patient is morbidly obese and has a BMI of 44.35   HENT:   Head: Normocephalic and atraumatic.   Right Ear: External ear normal.   Left Ear: External ear normal.   Nose: Nose normal.   Mouth/Throat: Oropharynx is clear and moist.   Eyes: Pupils are equal, round, and reactive to light. Conjunctivae and EOM are normal.   Neck: Normal range of motion. Neck supple. No JVD present. No thyromegaly present.   Cardiovascular: Normal rate, regular rhythm, normal heart sounds and intact distal pulses. Exam reveals no gallop and no friction rub.   No murmur heard.  Pulmonary/Chest: Effort normal and breath sounds normal. No stridor. No respiratory distress. She has no wheezes. She has no rales. She exhibits no tenderness.   She has distant breath sounds at the bases.   Abdominal: Soft. Bowel sounds are normal. She exhibits no distension and no mass. There is no tenderness. There is no rebound and no guarding. No hernia.   Musculoskeletal: Normal range of motion. She exhibits no edema, tenderness or deformity.   Neurological: She is alert and oriented to person, place, and time. She has normal reflexes.   Skin: Skin is warm and dry. No rash noted. She is not diaphoretic. No erythema. No pallor.   Psychiatric: She has a normal mood and affect. Her behavior is normal. Judgment and thought content normal.   Nursing note and vitals reviewed.        Medication Review:    Current Facility-Administered Medications:   •  acetaminophen (TYLENOL) tablet 650 mg, 650 mg, Oral, Q4H PRN, Dru Valles MD, 650 mg at 07/19/20 2015  •  cefTRIAXone (ROCEPHIN) 2 g/100 mL 0.9% NS VTB (HEIDY), 2 g, Intravenous, Q24H, Dru Valles MD, 2 g at 07/21/20 0945  •  enoxaparin (LOVENOX) syringe 120 mg, 1 mg/kg, Subcutaneous, Q12H, Dru Valles MD, 120 mg at 07/21/20 0933  •  folic acid (FOLVITE)  tablet 1 mg, 1 mg, Oral, Daily, Dru Valles MD, 1 mg at 07/21/20 0933  •  HYDROcodone-acetaminophen (NORCO) 5-325 MG per tablet 1 tablet, 1 tablet, Oral, Q6H PRN, Dru Valles MD  •  levothyroxine (SYNTHROID, LEVOTHROID) tablet 88 mcg, 88 mcg, Oral, Daily, Dru Valles MD, 88 mcg at 07/21/20 0933  •  Morphine (MSIR) tablet 15 mg, 15 mg, Oral, Q4H PRN, Dru Valles MD, 15 mg at 07/20/20 2218  •  morphine injection 4 mg, 4 mg, Intravenous, Q4H PRN, Dru Valles MD, 4 mg at 07/20/20 0325  •  ondansetron (ZOFRAN) injection 4 mg, 4 mg, Intravenous, Q6H PRN, Dru Valles MD  •  Pharmacy to Dose enoxaparin (LOVENOX), , Does not apply, Continuous PRN, Dru Valles MD  •  Pharmacy to dose warfarin, , Does not apply, Continuous PRN, Dru Valles MD  •  predniSONE (DELTASONE) tablet 5 mg, 5 mg, Oral, Every Other Day, Dru Valles MD, 5 mg at 07/21/20 0933  •  sodium chloride 0.9 % flush 10 mL, 10 mL, Intravenous, Q12H, Dru Valles MD, 10 mL at 07/21/20 0934  •  sodium chloride 0.9 % flush 10 mL, 10 mL, Intravenous, PRN, Dru Valles MD, 10 mL at 07/18/20 1809  •  warfarin (COUMADIN) tablet 10 mg, 10 mg, Oral, Daily, Dru Valles MD, 10 mg at 07/20/20 1804    Results Review:  I have reviewed the labs, radiology results, and diagnostic studies.    Laboratory Data:   Results from last 7 days   Lab Units 07/21/20  0940 07/20/20  0532 07/19/20  0646  07/16/20 2006   SODIUM mmol/L 133* 132* 128*   < > 136   POTASSIUM mmol/L 3.5 3.7 3.8   < > 4.3   CHLORIDE mmol/L 97* 96* 94*   < > 102   CO2 mmol/L 23.0 26.0 24.0   < > 22.0   BUN mg/dL 5* 9 8   < > 10   CREATININE mg/dL 0.83 0.75 0.78   < > 0.76   GLUCOSE mg/dL 144* 102* 103*   < > 107*   CALCIUM mg/dL 8.8 8.9 9.3   < > 9.5   BILIRUBIN mg/dL  --   --   --   --  0.9   ALK PHOS U/L  --   --   --   --  122*   ALT (SGPT) U/L  --   --   --   --  17   AST (SGOT) U/L  --   --   --   --  18   ANION GAP  mmol/L 13.0 10.0 10.0   < > 12.0    < > = values in this interval not displayed.     Estimated Creatinine Clearance: 103.9 mL/min (by C-G formula based on SCr of 0.83 mg/dL).  Results from last 7 days   Lab Units 07/16/20 2006   MAGNESIUM mg/dL 1.9         Results from last 7 days   Lab Units 07/20/20  0532 07/19/20  0646 07/18/20  0545 07/17/20  0855 07/16/20 2006   WBC 10*3/mm3 7.46 8.64 10.40 9.09 9.33   HEMOGLOBIN g/dL 10.8* 11.2* 12.3 13.1 14.0   HEMATOCRIT % 32.4* 33.5* 36.6 37.8 40.8   PLATELETS 10*3/mm3 255 242 249 239 229     Results from last 7 days   Lab Units 07/21/20  1045 07/20/20  0532 07/19/20  0646 07/18/20  0546 07/17/20  1423   INR  1.60* 1.21* 1.15 1.12 1.02       Culture Data:   No results found for: BLOODCX  No results found for: URINECX  No results found for: RESPCX  No results found for: WOUNDCX  No results found for: STOOLCX  No components found for: BODYFLD    Radiology Data:   Imaging Results (Last 24 Hours)     ** No results found for the last 24 hours. **          I have reviewed the patient's current medications.     Assessment/Plan     Hospital Problem List:  Active Problems:    Acute pulmonary embolism (CMS/HCC)    Pulmonary emboli (CMS/HCC)  Continue Lovenox with warfarin bridge.  INR is 1.60.  Duplex ultrasound both legs was negative for DVTs.  Echocardiogram showed:  · Estimated EF = 65%.  · Left ventricular systolic function is normal.  · Left ventricular diastolic dysfunction (grade I) consistent with impaired relaxation    Left lower lobe pneumonia: Continue IV antibiotics.  Strep and Legionella screens were unremarkable and blood cultures showed no growth.    Hypothyroidism: Continue Synthroid.    Hyponatremia: Improving.  Continue isotonic saline and restrict free water.    History of ehrlichiosis: Continue doxycycline.    Rheumatoid arthritis: Continue prednisone.  Slight hyperglycemia is steroid-induced.    Morbid obesity: Diet and lifestyle modification have been  discussed with patient.  Dietitian is following.    Continue GI prophylaxis.    Discharge Planning: In progress.    Pradip Castrejon MD   07/21/20   12:08

## 2020-07-22 LAB
ANION GAP SERPL CALCULATED.3IONS-SCNC: 9 MMOL/L (ref 5–15)
BUN SERPL-MCNC: 7 MG/DL (ref 6–20)
BUN/CREAT SERPL: 8 (ref 7–25)
CALCIUM SPEC-SCNC: 8.7 MG/DL (ref 8.6–10.5)
CHLORIDE SERPL-SCNC: 100 MMOL/L (ref 98–107)
CO2 SERPL-SCNC: 27 MMOL/L (ref 22–29)
CREAT SERPL-MCNC: 0.87 MG/DL (ref 0.57–1)
GFR SERPL CREATININE-BSD FRML MDRD: 69 ML/MIN/1.73
GLUCOSE SERPL-MCNC: 102 MG/DL (ref 65–99)
INR PPP: 1.5 (ref 0.8–1.2)
POTASSIUM SERPL-SCNC: 3.9 MMOL/L (ref 3.5–5.2)
PROTHROMBIN TIME: 18.9 SECONDS (ref 11.1–15.3)
SODIUM SERPL-SCNC: 136 MMOL/L (ref 136–145)

## 2020-07-22 PROCEDURE — 85610 PROTHROMBIN TIME: CPT | Performed by: INTERNAL MEDICINE

## 2020-07-22 PROCEDURE — 25010000002 ENOXAPARIN PER 10 MG: Performed by: INTERNAL MEDICINE

## 2020-07-22 PROCEDURE — 25010000002 CEFTRIAXONE PER 250 MG: Performed by: INTERNAL MEDICINE

## 2020-07-22 PROCEDURE — 80048 BASIC METABOLIC PNL TOTAL CA: CPT | Performed by: INTERNAL MEDICINE

## 2020-07-22 RX ORDER — WARFARIN SODIUM 10 MG/1
10 TABLET ORAL
Status: DISCONTINUED | OUTPATIENT
Start: 2020-07-22 | End: 2020-07-23 | Stop reason: HOSPADM

## 2020-07-22 RX ADMIN — SODIUM CHLORIDE, PRESERVATIVE FREE 10 ML: 5 INJECTION INTRAVENOUS at 08:38

## 2020-07-22 RX ADMIN — ENOXAPARIN SODIUM 120 MG: 120 INJECTION SUBCUTANEOUS at 21:11

## 2020-07-22 RX ADMIN — CEFTRIAXONE SODIUM 2 G: 2 INJECTION, POWDER, FOR SOLUTION INTRAMUSCULAR; INTRAVENOUS at 09:59

## 2020-07-22 RX ADMIN — FOLIC ACID 1 MG: 1 TABLET ORAL at 08:37

## 2020-07-22 RX ADMIN — SODIUM CHLORIDE, PRESERVATIVE FREE 10 ML: 5 INJECTION INTRAVENOUS at 21:11

## 2020-07-22 RX ADMIN — LEVOTHYROXINE SODIUM 88 MCG: 88 TABLET ORAL at 08:37

## 2020-07-22 RX ADMIN — ENOXAPARIN SODIUM 120 MG: 120 INJECTION SUBCUTANEOUS at 08:37

## 2020-07-22 RX ADMIN — WARFARIN SODIUM 10 MG: 10 TABLET ORAL at 17:08

## 2020-07-22 NOTE — PROGRESS NOTES
[facility] Medicine Services  INPATIENT PROGRESS NOTE    Length of Stay: 5  Date of Admission: 7/16/2020  Primary Care Physician: Javed Thomson MD    Subjective   Chief Complaint: No new complaints.    HPI: Patient is seen for follow-up.  She is a 50-year-old female with hypothyroidism, morbid obesity who was admitted for acute pulmonary embolus and pneumonia.   She is doing better, less symptomatic and maintaining O2 saturation of 94 to 96% on room air.  She voices no new complaints and is eager to be discharged.    Review of Systems   Constitutional: Negative for activity change, appetite change, chills, diaphoresis, fatigue and fever.   HENT: Negative for trouble swallowing and voice change.    Eyes: Negative for photophobia and visual disturbance.   Respiratory: Negative for cough, choking, chest tightness, shortness of breath, wheezing and stridor.    Cardiovascular: Negative for chest pain, palpitations and leg swelling.   Gastrointestinal: Negative for abdominal distention, abdominal pain, blood in stool, constipation, diarrhea, nausea and vomiting.   Endocrine: Negative for cold intolerance, heat intolerance, polydipsia, polyphagia and polyuria.   Genitourinary: Negative for decreased urine volume, difficulty urinating, dysuria, enuresis, flank pain, frequency, hematuria and urgency.   Musculoskeletal: Negative for arthralgias, gait problem, myalgias, neck pain and neck stiffness.   Skin: Negative for pallor, rash and wound.   Neurological: Negative for dizziness, tremors, seizures, syncope, facial asymmetry, speech difficulty, weakness, light-headedness, numbness and headaches.   Hematological: Does not bruise/bleed easily.   Psychiatric/Behavioral: Negative for agitation, behavioral problems and confusion.       Objective    Temp:  [97.5 °F (36.4 °C)-98.4 °F (36.9 °C)] 98 °F (36.7 °C)  Heart Rate:  [84-96] 90  Resp:  [18] 18  BP: (121-142)/(61-81)  142/81    Physical Exam   Constitutional: She is oriented to person, place, and time. She appears well-developed and well-nourished. She is cooperative. No distress.   Patient is morbidly obese and has a BMI of 44.61    HENT:   Head: Normocephalic and atraumatic.   Right Ear: External ear normal.   Left Ear: External ear normal.   Nose: Nose normal.   Mouth/Throat: Oropharynx is clear and moist.   Eyes: Pupils are equal, round, and reactive to light. Conjunctivae and EOM are normal.   Neck: Normal range of motion. Neck supple. No JVD present. No thyromegaly present.   Cardiovascular: Normal rate, regular rhythm, normal heart sounds and intact distal pulses. Exam reveals no gallop and no friction rub.   No murmur heard.  Pulmonary/Chest: Effort normal and breath sounds normal. No stridor. No respiratory distress. She has no wheezes. She has no rales. She exhibits no tenderness.   She has distant breath sounds at the bases.   Abdominal: Soft. Bowel sounds are normal. She exhibits no distension and no mass. There is no tenderness. There is no rebound and no guarding. No hernia.   Musculoskeletal: Normal range of motion. She exhibits no edema, tenderness or deformity.   Neurological: She is alert and oriented to person, place, and time. She has normal reflexes.   Skin: Skin is warm and dry. No rash noted. She is not diaphoretic. No erythema. No pallor.   Psychiatric: She has a normal mood and affect. Her behavior is normal. Judgment and thought content normal.   Nursing note and vitals reviewed.        Medication Review:    Current Facility-Administered Medications:   •  acetaminophen (TYLENOL) tablet 650 mg, 650 mg, Oral, Q4H PRN, Dru Valles MD, 650 mg at 07/19/20 2015  •  cefTRIAXone (ROCEPHIN) 2 g/100 mL 0.9% NS VTB (HEIDY), 2 g, Intravenous, Q24H, Dru Valles MD, 2 g at 07/22/20 0959  •  enoxaparin (LOVENOX) syringe 120 mg, 1 mg/kg, Subcutaneous, Q12H, Dru Valles MD, 120 mg at 07/22/20  0837  •  folic acid (FOLVITE) tablet 1 mg, 1 mg, Oral, Daily, Dru Valles MD, 1 mg at 07/22/20 0837  •  HYDROcodone-acetaminophen (NORCO) 5-325 MG per tablet 1 tablet, 1 tablet, Oral, Q6H PRN, Dru Valles MD  •  levothyroxine (SYNTHROID, LEVOTHROID) tablet 88 mcg, 88 mcg, Oral, Daily, Dru Valles MD, 88 mcg at 07/22/20 0837  •  Morphine (MSIR) tablet 15 mg, 15 mg, Oral, Q4H PRN, Dru Valles MD, 15 mg at 07/21/20 2241  •  morphine injection 4 mg, 4 mg, Intravenous, Q4H PRN, Dru Valles MD, 4 mg at 07/20/20 0325  •  ondansetron (ZOFRAN) injection 4 mg, 4 mg, Intravenous, Q6H PRN, Dru Valles MD  •  Pharmacy to Dose enoxaparin (LOVENOX), , Does not apply, Continuous PRN, Dru Valles MD  •  Pharmacy to dose warfarin, , Does not apply, Continuous PRN, Dru Valles MD  •  potassium chloride (MICRO-K) CR capsule 40 mEq, 40 mEq, Oral, PRN, 40 mEq at 07/21/20 2235 **OR** potassium chloride (KLOR-CON) packet 40 mEq, 40 mEq, Oral, PRN **OR** potassium chloride 10 mEq in 100 mL IVPB, 10 mEq, Intravenous, Q1H PRN, Pradip Castrejon MD  •  predniSONE (DELTASONE) tablet 5 mg, 5 mg, Oral, Every Other Day, Dru Valles MD, 5 mg at 07/21/20 0933  •  sodium chloride 0.9 % flush 10 mL, 10 mL, Intravenous, Q12H, Dru Valles MD, 10 mL at 07/22/20 0838  •  sodium chloride 0.9 % flush 10 mL, 10 mL, Intravenous, PRN, Dru Valles MD, 10 mL at 07/18/20 1809  •  warfarin (COUMADIN) tablet 7.5 mg, 7.5 mg, Oral, Daily, Dru Valles MD, 7.5 mg at 07/21/20 1724    Results Review:  I have reviewed the labs, radiology results, and diagnostic studies.    Laboratory Data:   Results from last 7 days   Lab Units 07/22/20  0419 07/21/20  0940 07/20/20  0532  07/16/20 2006   SODIUM mmol/L 136 133* 132*   < > 136   POTASSIUM mmol/L 3.9 3.5 3.7   < > 4.3   CHLORIDE mmol/L 100 97* 96*   < > 102   CO2 mmol/L 27.0 23.0 26.0   < > 22.0   BUN mg/dL 7 5* 9   < > 10    CREATININE mg/dL 0.87 0.83 0.75   < > 0.76   GLUCOSE mg/dL 102* 144* 102*   < > 107*   CALCIUM mg/dL 8.7 8.8 8.9   < > 9.5   BILIRUBIN mg/dL  --   --   --   --  0.9   ALK PHOS U/L  --   --   --   --  122*   ALT (SGPT) U/L  --   --   --   --  17   AST (SGOT) U/L  --   --   --   --  18   ANION GAP mmol/L 9.0 13.0 10.0   < > 12.0    < > = values in this interval not displayed.     Estimated Creatinine Clearance: 99.7 mL/min (by C-G formula based on SCr of 0.87 mg/dL).  Results from last 7 days   Lab Units 07/16/20 2006   MAGNESIUM mg/dL 1.9         Results from last 7 days   Lab Units 07/20/20  0532 07/19/20  0646 07/18/20  0545 07/17/20  0855 07/16/20 2006   WBC 10*3/mm3 7.46 8.64 10.40 9.09 9.33   HEMOGLOBIN g/dL 10.8* 11.2* 12.3 13.1 14.0   HEMATOCRIT % 32.4* 33.5* 36.6 37.8 40.8   PLATELETS 10*3/mm3 255 242 249 239 229     Results from last 7 days   Lab Units 07/22/20  0419 07/21/20  1045 07/20/20  0532 07/19/20  0646 07/18/20  0546   INR  1.50* 1.60* 1.21* 1.15 1.12       Culture Data:   No results found for: BLOODCX  No results found for: URINECX  No results found for: RESPCX  No results found for: WOUNDCX  No results found for: STOOLCX  No components found for: BODYFLD    Radiology Data:   Imaging Results (Last 24 Hours)     ** No results found for the last 24 hours. **          I have reviewed the patient's current medications.     Assessment/Plan     Hospital Problem List:  Active Problems:    Acute pulmonary embolism (CMS/HCC)    Pulmonary emboli (CMS/HCC)  Continue Lovenox with warfarin bridge.  INR is 1.50.  Duplex ultrasound both legs was negative for DVTs.  Echocardiogram showed:  · Estimated EF = 65%.  · Left ventricular systolic function is normal.  · Left ventricular diastolic dysfunction (grade I) consistent with impaired relaxation    Left lower lobe pneumonia: Continue IV antibiotics.  Strep and Legionella screens were unremarkable and blood cultures showed no growth.    Hypothyroidism: Continue  Synthroid.    Hyponatremia: Resolved.      History of ehrlichiosis: Continue doxycycline.    Rheumatoid arthritis: Continue prednisone.  Slight hyperglycemia is steroid-induced and is much improved..    Morbid obesity: Diet and lifestyle modification have been discussed with patient.  Dietitian is following.    Continue GI prophylaxis.    Discharge Planning: In progress.    Pradpi Castrejon MD   07/22/20   11:32

## 2020-07-22 NOTE — PROGRESS NOTES
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  Patient is also receiving enoxaparin     Home regimen: New start  INR Goal: 2-3    Last INR:   Lab Results   Component Value Date    INR 1.50 (H) 07/22/2020       Objective:  [Ht: 163 cm (64.17\"); Wt: 119 kg (261 lb 4.8 oz)]  Lab Results   Component Value Date    INR 1.50 (H) 07/22/2020    INR 1.60 (H) 07/21/2020    INR 1.21 (H) 07/20/2020    PROTIME 18.9 (H) 07/22/2020    PROTIME 15.9 (H) 07/20/2020    PROTIME 15.2 07/19/2020     Lab Results   Component Value Date    HGB 10.8 (L) 07/20/2020    HGB 11.2 (L) 07/19/2020    HGB 12.3 07/18/2020    HCT 32.4 (L) 07/20/2020    HCT 33.5 (L) 07/19/2020    HCT 36.6 07/18/2020     07/20/2020     07/19/2020     07/18/2020       Recent Warfarin Administrations       The 5 most recent administrations since 07/12/2020 are shown below each listed medication.    Warfarin Sodium         Order Dose Date Given     warfarin (COUMADIN) tablet 10 mg 10 mg 07/18/2020      10 mg 07/17/2020                      Assessment  Interacting medications: lovenox, levothyroxine, prednisone  INR is subtherapeutic, trended down overnight    No new h/h today  No s/s of bleeding noted  Increase back to 10 mg PO daily and trend     Plan:  1.  Give warfarin 10 mg tablet PO @ 1800 tonight  2.  Draw a PT/INR in AM  3.  Pharmacy will continue to follow    Bienvenido Giron RPH  07/22/20 14:15     "

## 2020-07-22 NOTE — PLAN OF CARE
Problem: Patient Care Overview  Goal: Plan of Care Review  Outcome: Ongoing (interventions implemented as appropriate)  Flowsheets (Taken 7/22/2020 0104)  Progress: no change  Plan of Care Reviewed With: patient  Outcome Summary: No new complaints. VSS, will continue to monitor.

## 2020-07-23 VITALS
WEIGHT: 265.4 LBS | HEART RATE: 102 BPM | SYSTOLIC BLOOD PRESSURE: 115 MMHG | OXYGEN SATURATION: 92 % | DIASTOLIC BLOOD PRESSURE: 72 MMHG | HEIGHT: 64 IN | RESPIRATION RATE: 18 BRPM | BODY MASS INDEX: 45.31 KG/M2 | TEMPERATURE: 97.6 F

## 2020-07-23 PROBLEM — I26.99 ACUTE PULMONARY EMBOLISM WITHOUT ACUTE COR PULMONALE, UNSPECIFIED PULMONARY EMBOLISM TYPE (HCC): Status: ACTIVE | Noted: 2020-07-23

## 2020-07-23 LAB
ANION GAP SERPL CALCULATED.3IONS-SCNC: 12 MMOL/L (ref 5–15)
BASOPHILS # BLD AUTO: 0.03 10*3/MM3 (ref 0–0.2)
BASOPHILS NFR BLD AUTO: 0.5 % (ref 0–1.5)
BUN SERPL-MCNC: 8 MG/DL (ref 6–20)
BUN/CREAT SERPL: 11.6 (ref 7–25)
CALCIUM SPEC-SCNC: 8.8 MG/DL (ref 8.6–10.5)
CHLORIDE SERPL-SCNC: 99 MMOL/L (ref 98–107)
CO2 SERPL-SCNC: 25 MMOL/L (ref 22–29)
CREAT SERPL-MCNC: 0.69 MG/DL (ref 0.57–1)
DEPRECATED RDW RBC AUTO: 47.5 FL (ref 37–54)
EOSINOPHIL # BLD AUTO: 0.39 10*3/MM3 (ref 0–0.4)
EOSINOPHIL NFR BLD AUTO: 6.4 % (ref 0.3–6.2)
ERYTHROCYTE [DISTWIDTH] IN BLOOD BY AUTOMATED COUNT: 14.8 % (ref 12.3–15.4)
GFR SERPL CREATININE-BSD FRML MDRD: 90 ML/MIN/1.73
GLUCOSE SERPL-MCNC: 86 MG/DL (ref 65–99)
HCT VFR BLD AUTO: 35.1 % (ref 34–46.6)
HGB BLD-MCNC: 11.6 G/DL (ref 12–15.9)
IMM GRANULOCYTES # BLD AUTO: 0.26 10*3/MM3 (ref 0–0.05)
IMM GRANULOCYTES NFR BLD AUTO: 4.3 % (ref 0–0.5)
INR PPP: 1.51 (ref 0.8–1.2)
LYMPHOCYTES # BLD AUTO: 2.11 10*3/MM3 (ref 0.7–3.1)
LYMPHOCYTES NFR BLD AUTO: 34.8 % (ref 19.6–45.3)
MCH RBC QN AUTO: 28.7 PG (ref 26.6–33)
MCHC RBC AUTO-ENTMCNC: 33 G/DL (ref 31.5–35.7)
MCV RBC AUTO: 86.9 FL (ref 79–97)
MONOCYTES # BLD AUTO: 0.48 10*3/MM3 (ref 0.1–0.9)
MONOCYTES NFR BLD AUTO: 7.9 % (ref 5–12)
NEUTROPHILS NFR BLD AUTO: 2.79 10*3/MM3 (ref 1.7–7)
NEUTROPHILS NFR BLD AUTO: 46.1 % (ref 42.7–76)
NRBC BLD AUTO-RTO: 0 /100 WBC (ref 0–0.2)
PLATELET # BLD AUTO: 358 10*3/MM3 (ref 140–450)
PMV BLD AUTO: 9.9 FL (ref 6–12)
POTASSIUM SERPL-SCNC: 4.2 MMOL/L (ref 3.5–5.2)
PROTHROMBIN TIME: 19 SECONDS (ref 11.1–15.3)
RBC # BLD AUTO: 4.04 10*6/MM3 (ref 3.77–5.28)
SODIUM SERPL-SCNC: 136 MMOL/L (ref 136–145)
WBC # BLD AUTO: 6.06 10*3/MM3 (ref 3.4–10.8)

## 2020-07-23 PROCEDURE — 80048 BASIC METABOLIC PNL TOTAL CA: CPT | Performed by: INTERNAL MEDICINE

## 2020-07-23 PROCEDURE — 25010000002 ENOXAPARIN PER 10 MG: Performed by: INTERNAL MEDICINE

## 2020-07-23 PROCEDURE — 85025 COMPLETE CBC W/AUTO DIFF WBC: CPT | Performed by: INTERNAL MEDICINE

## 2020-07-23 PROCEDURE — 85610 PROTHROMBIN TIME: CPT | Performed by: INTERNAL MEDICINE

## 2020-07-23 PROCEDURE — 25010000002 CEFTRIAXONE PER 250 MG: Performed by: INTERNAL MEDICINE

## 2020-07-23 PROCEDURE — 63710000001 PREDNISONE PER 5 MG: Performed by: INTERNAL MEDICINE

## 2020-07-23 RX ORDER — WARFARIN SODIUM 10 MG/1
10 TABLET ORAL NIGHTLY
Qty: 30 TABLET | Refills: 1 | Status: SHIPPED | OUTPATIENT
Start: 2020-07-23

## 2020-07-23 RX ADMIN — SODIUM CHLORIDE, PRESERVATIVE FREE 10 ML: 5 INJECTION INTRAVENOUS at 08:08

## 2020-07-23 RX ADMIN — PREDNISONE 5 MG: 5 TABLET ORAL at 08:08

## 2020-07-23 RX ADMIN — FOLIC ACID 1 MG: 1 TABLET ORAL at 08:08

## 2020-07-23 RX ADMIN — LEVOTHYROXINE SODIUM 88 MCG: 88 TABLET ORAL at 08:08

## 2020-07-23 RX ADMIN — ENOXAPARIN SODIUM 120 MG: 120 INJECTION SUBCUTANEOUS at 08:08

## 2020-07-23 RX ADMIN — MORPHINE SULFATE 15 MG: 15 TABLET ORAL at 00:08

## 2020-07-23 RX ADMIN — CEFTRIAXONE SODIUM 2 G: 2 INJECTION, POWDER, FOR SOLUTION INTRAMUSCULAR; INTRAVENOUS at 09:37

## 2020-07-23 NOTE — DISCHARGE INSTR - OTHER ORDERS
Go to [facility] on 7/24/20 before 12:00 for PT/INR.   Dr Velez office will call you with instructions for coumadin dosing.

## 2020-07-23 NOTE — DISCHARGE INSTR - APPOINTMENTS
Javed Thomson MD  PCP - General Family Medicine   Phone [phone] Fax [phone]   [place] [id number]    Next Steps: Follow up in 4 day(s) July 27th at 1:00 pm

## 2020-07-23 NOTE — DISCHARGE SUMMARY
[facility] Medicine Services  DISCHARGE SUMMARY       Date of Admission: 7/16/2020  Date of Discharge:  7/23/2020  Primary Care Physician: Javed Thomson MD    Presenting Problem/History of Present Illness:  Acute pulmonary embolism (CMS/HCC) [I26.99]  Pulmonary emboli (CMS/HCC) [I26.99]   Patient is a 50 y.o. female with past medical history of rheumatoid arthritis, morbid obesity, hypothyroidism and recent admission and treatment for sepsis and ehrlichiosis.  She presents with complaints of worsening pleuritic chest pain of 3 days duration. She has slight shortness of breath on exertion but denied fevers, cough, sore throat or runny nose.  She presented to the urgent care and blood work done and imaging was suggestive of pulmonary embolism.  She was transferred as a direct admit.  At the time of my evaluation patient complains of pleuritic chest pain and back pain from laying on the CT scanner.  She denies lightheadedness.  She states that she has been with activity mobile at home and only slightly limited by her fatigue from ehrlichiosis.  She denies long distance travel or trauma to her legs.    Final Discharge Diagnoses:  Active Hospital Problems    Diagnosis   • Pulmonary emboli (CMS/HCC)   • Acute pulmonary embolism (CMS/HCC)       Consults:   Consults     Date and Time Order Name Status Description    6/29/2020 1817 Inpatient Nephrology Consult Completed           Procedures Performed: None.                Pertinent Test Results:   Lab Results (last 7 days)     Procedure Component Value Units Date/Time    Basic Metabolic Panel [[id number]]  (Normal) Collected:  07/23/20 0645    Specimen:  Blood Updated:  07/23/20 0735     Glucose 86 mg/dL      BUN 8 mg/dL      Creatinine 0.69 mg/dL      Sodium 136 mmol/L      Potassium 4.2 mmol/L      Chloride 99 mmol/L      CO2 25.0 mmol/L      Calcium 8.8 mg/dL      eGFR Non African Amer 90 mL/min/1.73      BUN/Creatinine Ratio  11.6     Anion Gap 12.0 mmol/L     Narrative:       GFR Normal >60  Chronic Kidney Disease <60  Kidney Failure <15      Protime-INR [[id number]]  (Abnormal) Collected:  07/23/20 0645    Specimen:  Blood Updated:  07/23/20 0732     Protime 19.0 Seconds      INR 1.51    Narrative:       Therapeutic range for most indications is 2.0-3.0 INR,  or 2.5-3.5 for mechanical heart valves.    CBC & Differential [[id number]] Collected:  07/23/20 0645    Specimen:  Blood Updated:  07/23/20 0728    Narrative:       The following orders were created for panel order CBC & Differential.  Procedure                               Abnormality         Status                     ---------                               -----------         ------                     CBC Auto Differential[[id number]]        Abnormal            Final result                 Please view results for these tests on the individual orders.    CBC Auto Differential [[id number]]  (Abnormal) Collected:  07/23/20 0645    Specimen:  Blood Updated:  07/23/20 0728     WBC 6.06 10*3/mm3      RBC 4.04 10*6/mm3      Hemoglobin 11.6 g/dL      Hematocrit 35.1 %      MCV 86.9 fL      MCH 28.7 pg      MCHC 33.0 g/dL      RDW 14.8 %      RDW-SD 47.5 fl      MPV 9.9 fL      Platelets 358 10*3/mm3      Neutrophil % 46.1 %      Lymphocyte % 34.8 %      Monocyte % 7.9 %      Eosinophil % 6.4 %      Basophil % 0.5 %      Immature Grans % 4.3 %      Neutrophils, Absolute 2.79 10*3/mm3      Lymphocytes, Absolute 2.11 10*3/mm3      Monocytes, Absolute 0.48 10*3/mm3      Eosinophils, Absolute 0.39 10*3/mm3      Basophils, Absolute 0.03 10*3/mm3      Immature Grans, Absolute 0.26 10*3/mm3      nRBC 0.0 /100 WBC     Basic Metabolic Panel [[id number]]  (Abnormal) Collected:  07/22/20 0419    Specimen:  Blood Updated:  07/22/20 0442     Glucose 102 mg/dL      BUN 7 mg/dL      Creatinine 0.87 mg/dL      Sodium 136 mmol/L      Potassium 3.9 mmol/L      Chloride 100 mmol/L      CO2 27.0 mmol/L       Calcium 8.7 mg/dL      eGFR Non African Amer 69 mL/min/1.73      BUN/Creatinine Ratio 8.0     Anion Gap 9.0 mmol/L     Narrative:       GFR Normal >60  Chronic Kidney Disease <60  Kidney Failure <15      Protime-INR [[id number]]  (Abnormal) Collected:  07/22/20 0419    Specimen:  Blood Updated:  07/22/20 0435     Protime 18.9 Seconds      INR 1.50    Narrative:       Therapeutic range for most indications is 2.0-3.0 INR,  or 2.5-3.5 for mechanical heart valves.    Blood Culture - Blood, Arm, Right [[id number]] Collected:  07/16/20 2314    Specimen:  Blood from Arm, Right Updated:  07/21/20 2345     Blood Culture No growth at 5 days    Blood Culture - Blood, Hand, Right [[id number]] Collected:  07/16/20 2314    Specimen:  Blood from Hand, Right Updated:  07/21/20 2345     Blood Culture No growth at 5 days    Protime-INR, Fingerstick [[id number]]  (Abnormal) Collected:  07/21/20 1045    Specimen:  Capillary Blood Updated:  07/21/20 1104     INR 1.60    Narrative:       Therapeutic range for most indications is 2.0-3.0 INR,  or 2.5-3.5 for mechanical heart valves.    Basic Metabolic Panel [[id number]]  (Abnormal) Collected:  07/21/20 0940    Specimen:  Blood Updated:  07/21/20 1009     Glucose 144 mg/dL      BUN 5 mg/dL      Creatinine 0.83 mg/dL      Sodium 133 mmol/L      Potassium 3.5 mmol/L      Chloride 97 mmol/L      CO2 23.0 mmol/L      Calcium 8.8 mg/dL      eGFR Non African Amer 73 mL/min/1.73      BUN/Creatinine Ratio 6.0     Anion Gap 13.0 mmol/L     Narrative:       GFR Normal >60  Chronic Kidney Disease <60  Kidney Failure <15      Basic Metabolic Panel [[id number]]  (Abnormal) Collected:  07/20/20 0532    Specimen:  Blood Updated:  07/20/20 0621     Glucose 102 mg/dL      BUN 9 mg/dL      Creatinine 0.75 mg/dL      Sodium 132 mmol/L      Potassium 3.7 mmol/L      Chloride 96 mmol/L      CO2 26.0 mmol/L      Calcium 8.9 mg/dL      eGFR Non African Amer 82 mL/min/1.73      BUN/Creatinine Ratio 12.0     Anion Gap  10.0 mmol/L     Narrative:       GFR Normal >60  Chronic Kidney Disease <60  Kidney Failure <15      Protime-INR [[id number]]  (Abnormal) Collected:  07/20/20 0532    Specimen:  Blood Updated:  07/20/20 0611     Protime 15.9 Seconds      INR 1.21    Narrative:       Therapeutic range for most indications is 2.0-3.0 INR,  or 2.5-3.5 for mechanical heart valves.    CBC & Differential [[id number]] Collected:  07/20/20 0532    Specimen:  Blood Updated:  07/20/20 0600    Narrative:       The following orders were created for panel order CBC & Differential.  Procedure                               Abnormality         Status                     ---------                               -----------         ------                     CBC Auto Differential[[id number]]        Abnormal            Final result                 Please view results for these tests on the individual orders.    CBC Auto Differential [[id number]]  (Abnormal) Collected:  07/20/20 0532    Specimen:  Blood Updated:  07/20/20 0600     WBC 7.46 10*3/mm3      RBC 3.83 10*6/mm3      Hemoglobin 10.8 g/dL      Hematocrit 32.4 %      MCV 84.6 fL      MCH 28.2 pg      MCHC 33.3 g/dL      RDW 14.6 %      RDW-SD 45.1 fl      MPV 10.8 fL      Platelets 255 10*3/mm3      Neutrophil % 58.5 %      Lymphocyte % 30.7 %      Monocyte % 8.3 %      Eosinophil % 1.5 %      Basophil % 0.5 %      Immature Grans % 0.5 %      Neutrophils, Absolute 4.36 10*3/mm3      Lymphocytes, Absolute 2.29 10*3/mm3      Monocytes, Absolute 0.62 10*3/mm3      Eosinophils, Absolute 0.11 10*3/mm3      Basophils, Absolute 0.04 10*3/mm3      Immature Grans, Absolute 0.04 10*3/mm3      nRBC 0.0 /100 WBC     Basic Metabolic Panel [[id number]]  (Abnormal) Collected:  07/19/20 0646    Specimen:  Blood Updated:  07/19/20 0710     Glucose 103 mg/dL      BUN 8 mg/dL      Creatinine 0.78 mg/dL      Sodium 128 mmol/L      Potassium 3.8 mmol/L      Chloride 94 mmol/L      CO2 24.0 mmol/L      Calcium 9.3 mg/dL       eGFR Non African Amer 78 mL/min/1.73      BUN/Creatinine Ratio 10.3     Anion Gap 10.0 mmol/L     Narrative:       GFR Normal >60  Chronic Kidney Disease <60  Kidney Failure <15      Protime-INR [[id number]]  (Normal) Collected:  07/19/20 0646    Specimen:  Blood Updated:  07/19/20 0706     Protime 15.2 Seconds      INR 1.15    Narrative:       Therapeutic range for most indications is 2.0-3.0 INR,  or 2.5-3.5 for mechanical heart valves.    CBC & Differential [[id number]] Collected:  07/19/20 0646    Specimen:  Blood Updated:  07/19/20 0655    Narrative:       The following orders were created for panel order CBC & Differential.  Procedure                               Abnormality         Status                     ---------                               -----------         ------                     CBC Auto Differential[[id number]]        Abnormal            Final result                 Please view results for these tests on the individual orders.    CBC Auto Differential [[id number]]  (Abnormal) Collected:  07/19/20 0646    Specimen:  Blood Updated:  07/19/20 0655     WBC 8.64 10*3/mm3      RBC 3.97 10*6/mm3      Hemoglobin 11.2 g/dL      Hematocrit 33.5 %      MCV 84.4 fL      MCH 28.2 pg      MCHC 33.4 g/dL      RDW 14.5 %      RDW-SD 44.8 fl      MPV 9.9 fL      Platelets 242 10*3/mm3      Neutrophil % 62.4 %      Lymphocyte % 26.6 %      Monocyte % 9.4 %      Eosinophil % 0.6 %      Basophil % 0.5 %      Immature Grans % 0.5 %      Neutrophils, Absolute 5.40 10*3/mm3      Lymphocytes, Absolute 2.30 10*3/mm3      Monocytes, Absolute 0.81 10*3/mm3      Eosinophils, Absolute 0.05 10*3/mm3      Basophils, Absolute 0.04 10*3/mm3      Immature Grans, Absolute 0.04 10*3/mm3      nRBC 0.0 /100 WBC     Basic Metabolic Panel [[id number]]  (Abnormal) Collected:  07/18/20 0546    Specimen:  Blood Updated:  07/18/20 0700     Glucose 105 mg/dL      BUN 11 mg/dL      Creatinine 0.85 mg/dL      Sodium 128 mmol/L       Potassium 3.7 mmol/L      Chloride 94 mmol/L      CO2 22.0 mmol/L      Calcium 9.4 mg/dL      eGFR Non African Amer 71 mL/min/1.73      BUN/Creatinine Ratio 12.9     Anion Gap 12.0 mmol/L     Narrative:       GFR Normal >60  Chronic Kidney Disease <60  Kidney Failure <15      Protime-INR [[id number]]  (Normal) Collected:  07/18/20 0546    Specimen:  Blood Updated:  07/18/20 0649     Protime 14.9 Seconds      INR 1.12    Narrative:       Therapeutic range for most indications is 2.0-3.0 INR,  or 2.5-3.5 for mechanical heart valves.    CBC & Differential [[id number]] Collected:  07/18/20 0545    Specimen:  Blood Updated:  07/18/20 0625    Narrative:       The following orders were created for panel order CBC & Differential.  Procedure                               Abnormality         Status                     ---------                               -----------         ------                     CBC Auto Differential[[id number]]        Abnormal            Final result                 Please view results for these tests on the individual orders.    CBC Auto Differential [[id number]]  (Abnormal) Collected:  07/18/20 0545    Specimen:  Blood Updated:  07/18/20 0625     WBC 10.40 10*3/mm3      RBC 4.34 10*6/mm3      Hemoglobin 12.3 g/dL      Hematocrit 36.6 %      MCV 84.3 fL      MCH 28.3 pg      MCHC 33.6 g/dL      RDW 14.7 %      RDW-SD 45.2 fl      MPV 10.5 fL      Platelets 249 10*3/mm3      Neutrophil % 61.6 %      Lymphocyte % 26.5 %      Monocyte % 10.6 %      Eosinophil % 0.3 %      Basophil % 0.6 %      Immature Grans % 0.4 %      Neutrophils, Absolute 6.41 10*3/mm3      Lymphocytes, Absolute 2.76 10*3/mm3      Monocytes, Absolute 1.10 10*3/mm3      Eosinophils, Absolute 0.03 10*3/mm3      Basophils, Absolute 0.06 10*3/mm3      Immature Grans, Absolute 0.04 10*3/mm3      nRBC 0.0 /100 WBC     Extra Tubes [[id number]] Collected:  07/17/20 1556    Specimen:  Blood, Venous Line Updated:  07/17/20 8164    Narrative:        The following orders were created for panel order Extra Tubes.  Procedure                               Abnormality         Status                     ---------                               -----------         ------                     Gold Top - SST[[id number]]                                   Final result                 Please view results for these tests on the individual orders.    Gold Top - SST [[id number]] Collected:  07/17/20 1556    Specimen:  Blood Updated:  07/17/20 1701     Extra Tube Hold for add-ons.     Comment: Auto resulted.       S. Pneumo Ag Urine or CSF - Urine, Urine, Clean Catch [[id number]]  (Normal) Collected:  07/17/20 1439    Specimen:  Urine, Clean Catch Updated:  07/17/20 1524     Strep Pneumo Ag Negative    Legionella Antigen, Urine - Urine, Urine, Clean Catch [[id number]]  (Normal) Collected:  07/17/20 1439    Specimen:  Urine, Clean Catch Updated:  07/17/20 1523     LEGIONELLA ANTIGEN, URINE Negative    Protime-INR [[id number]]  (Normal) Collected:  07/17/20 1423    Specimen:  Blood Updated:  07/17/20 1454     Protime 13.8 Seconds      INR 1.02    Narrative:       Therapeutic range for most indications is 2.0-3.0 INR,  or 2.5-3.5 for mechanical heart valves.    Urinalysis With Microscopic If Indicated (No Culture) - Urine, Clean Catch [[id number]]  (Abnormal) Collected:  07/17/20 1439    Specimen:  Urine, Clean Catch Updated:  07/17/20 1450     Color, UA Dark Yellow     Appearance, UA Cloudy     pH, UA 5.5     Specific Gravity, UA 1.014     Glucose, UA Negative     Ketones, UA Negative     Bilirubin, UA Negative     Blood, UA Small (1+)     Protein, UA Trace     Leuk Esterase, UA Small (1+)     Nitrite, UA Negative     Urobilinogen, UA 1.0 E.U./dL    Urinalysis, Microscopic Only - Urine, Clean Catch [[id number]]  (Abnormal) Collected:  07/17/20 1439    Specimen:  Urine, Clean Catch Updated:  07/17/20 1450     RBC, UA 6-12 /HPF      WBC, UA 13-20 /HPF      Bacteria, UA None Seen /HPF       Squamous Epithelial Cells, UA 6-12 /HPF      Hyaline Casts, UA 0-2 /LPF      Methodology Automated Microscopy    Ferritin [[id number]]  (Abnormal) Collected:  07/17/20 0855    Specimen:  Blood Updated:  07/17/20 0941     Ferritin 641.10 ng/mL     Narrative:       Results may be falsely decreased if patient taking Biotin.      Basic Metabolic Panel [[id number]]  (Abnormal) Collected:  07/17/20 0855    Specimen:  Blood Updated:  07/17/20 0939     Glucose 117 mg/dL      BUN 15 mg/dL      Creatinine 0.88 mg/dL      Sodium 130 mmol/L      Potassium 3.8 mmol/L      Chloride 95 mmol/L      CO2 21.0 mmol/L      Calcium 9.0 mg/dL      eGFR Non African Amer 68 mL/min/1.73      BUN/Creatinine Ratio 17.0     Anion Gap 14.0 mmol/L     Narrative:       GFR Normal >60  Chronic Kidney Disease <60  Kidney Failure <15      Lactate Dehydrogenase [[id number]]  (Normal) Collected:  07/17/20 0855    Specimen:  Blood Updated:  07/17/20 0937      U/L     C-reactive Protein [[id number]]  (Abnormal) Collected:  07/17/20 0855    Specimen:  Blood Updated:  07/17/20 0937     C-Reactive Protein 21.31 mg/dL     CBC & Differential [[id number]] Collected:  07/17/20 0855    Specimen:  Blood Updated:  07/17/20 0916    Narrative:       The following orders were created for panel order CBC & Differential.  Procedure                               Abnormality         Status                     ---------                               -----------         ------                     CBC Auto Differential[[id number]]        Abnormal            Final result                 Please view results for these tests on the individual orders.    CBC Auto Differential [[id number]]  (Abnormal) Collected:  07/17/20 0855    Specimen:  Blood Updated:  07/17/20 0916     WBC 9.09 10*3/mm3      RBC 4.51 10*6/mm3      Hemoglobin 13.1 g/dL      Hematocrit 37.8 %      MCV 83.8 fL      MCH 29.0 pg      MCHC 34.7 g/dL      RDW 14.8 %      RDW-SD 45.1 fl      MPV 10.4 fL       Platelets 239 10*3/mm3      Neutrophil % 61.5 %      Lymphocyte % 26.2 %      Monocyte % 10.8 %      Eosinophil % 0.3 %      Basophil % 0.8 %      Immature Grans % 0.4 %      Neutrophils, Absolute 5.59 10*3/mm3      Lymphocytes, Absolute 2.38 10*3/mm3      Monocytes, Absolute 0.98 10*3/mm3      Eosinophils, Absolute 0.03 10*3/mm3      Basophils, Absolute 0.07 10*3/mm3      Immature Grans, Absolute 0.04 10*3/mm3      nRBC 0.0 /100 WBC     COVID-19, [facility] IN-HOUSE, NP SWAB IN TRANSPORT MEDIA 8-10 HR TAT - Swab, Nasopharynx [[id number]]  (Normal) Collected:  07/16/20 2014    Specimen:  Swab from Nasopharynx Updated:  07/17/20 0451     COVID19 Not Detected    Narrative:       Testing performed by Real Time RT-PCR  This test has not been approved by the [facility] but is authorized under the Emergency Use Act (EUA)    Fact sheet for providers: https://www.fda.gov/media/483568/download    Fact sheet for patients: https://www.fda.gov/media/707980/download        Extra Tubes [[id number]] Collected:  07/16/20 2010    Specimen:  Blood, Venous Line Updated:  07/16/20 2115    Narrative:       The following orders were created for panel order Extra Tubes.  Procedure                               Abnormality         Status                     ---------                               -----------         ------                     Gold Top - SST[[id number]]                                   Final result                 Please view results for these tests on the individual orders.    Gold Top - SST [[id number]] Collected:  07/16/20 2010    Specimen:  Blood Updated:  07/16/20 2115     Extra Tube Hold for add-ons.     Comment: Auto resulted.       Comprehensive Metabolic Panel [[id number]]  (Abnormal) Collected:  07/16/20 2006    Specimen:  Blood Updated:  07/16/20 2036     Glucose 107 mg/dL      BUN 10 mg/dL      Creatinine 0.76 mg/dL      Sodium 136 mmol/L      Potassium 4.3 mmol/L      Chloride 102 mmol/L      CO2 22.0 mmol/L      Calcium 9.5  mg/dL      Total Protein 7.6 g/dL      Albumin 4.00 g/dL      ALT (SGPT) 17 U/L      AST (SGOT) 18 U/L      Alkaline Phosphatase 122 U/L      Total Bilirubin 0.9 mg/dL      eGFR Non African Amer 81 mL/min/1.73      Globulin 3.6 gm/dL      A/G Ratio 1.1 g/dL      BUN/Creatinine Ratio 13.2     Anion Gap 12.0 mmol/L     Narrative:       GFR Normal >60  Chronic Kidney Disease <60  Kidney Failure <15      Magnesium [[id number]]  (Normal) Collected:  07/16/20 2006    Specimen:  Blood Updated:  07/16/20 2036     Magnesium 1.9 mg/dL     aPTT [[id number]]  (Normal) Collected:  07/16/20 2006    Specimen:  Blood Updated:  07/16/20 2035     PTT 28.8 seconds     Narrative:       The recommended Heparin therapeutic range is 68-97 seconds.    Protime-INR [[id number]]  (Normal) Collected:  07/16/20 2006    Specimen:  Blood Updated:  07/16/20 2035     Protime 12.3 Seconds      INR 0.88    Narrative:       Therapeutic range for most indications is 2.0-3.0 INR,  or 2.5-3.5 for mechanical heart valves.    CBC & Differential [[id number]] Collected:  07/16/20 2006    Specimen:  Blood Updated:  07/16/20 2013    Narrative:       The following orders were created for panel order CBC & Differential.  Procedure                               Abnormality         Status                     ---------                               -----------         ------                     CBC Auto Differential[[id number]]        Abnormal            Final result                 Please view results for these tests on the individual orders.    CBC Auto Differential [[id number]]  (Abnormal) Collected:  07/16/20 2006    Specimen:  Blood Updated:  07/16/20 2013     WBC 9.33 10*3/mm3      RBC 4.88 10*6/mm3      Hemoglobin 14.0 g/dL      Hematocrit 40.8 %      MCV 83.6 fL      MCH 28.7 pg      MCHC 34.3 g/dL      RDW 14.6 %      RDW-SD 44.7 fl      MPV 10.6 fL      Platelets 229 10*3/mm3      Neutrophil % 64.8 %      Lymphocyte % 25.1 %      Monocyte % 9.2 %       Eosinophil % 0.1 %      Basophil % 0.5 %      Immature Grans % 0.3 %      Neutrophils, Absolute 6.04 10*3/mm3      Lymphocytes, Absolute 2.34 10*3/mm3      Monocytes, Absolute 0.86 10*3/mm3      Eosinophils, Absolute 0.01 10*3/mm3      Basophils, Absolute 0.05 10*3/mm3      Immature Grans, Absolute 0.03 10*3/mm3      nRBC 0.0 /100 WBC         Imaging Results (Last 7 Days)     Procedure Component Value Units Date/Time    XR Chest 1 View [[id number]] Collected:  07/18/20 1016     Updated:  07/18/20 1245    Narrative:       PROCEDURE: XR CHEST 1 VW    VIEWS:Single    INDICATION: Shortness of breath, fever    COMPARISON: CXR: 6/29/2020    FINDINGS:       - lines/tubes: None    - cardiac: Size within normal limits.    - mediastinum: Contour within normal limits.     - lungs:   L5 opacity in the left lung base, may represent atelectasis  and/or consolidation..     - pleura: Blunting of left costophrenic angle, and a pleural  effusion may be present.      - osseous: Unremarkable for age.        Impression:       Ill-defined left base opacification, may represent atelectasis,  consolidation, effusion, or a combination of these      Electronically signed by:  Leah Kaminski MD  7/18/2020 12:16 PM  CDT Workstation: [id number]    US Venous Doppler Lower Extremity Bilateral (duplex) [[id number]] Collected:  07/17/20 0723     Updated:  07/17/20 0819    Narrative:       PROCEDURE: US LOWER EXTREMITY VEINS BILATERAL    CLINICAL HISTORY:   Shortness of breath and chest pain    INDICATION: Same as above.    COMPARISON: None .    TECHNIQUE:     Gray scale imaging with duplex interrogation of the right and  left lower extremity venous system was performed and multiple  static images were obtained.     FINDINGS:     Utilizing compression and augmentation, there is no deep venous  thrombus in the common femoral, femoral, profunda femoris or  popliteal veins.     The peroneal and the posterior tibial veins are patent and  compressible.   "    The bilateral greater saphenous veins at their respective  saphenofemoral junctions are patent and compressible.    The imaged portions of the remainder of the bilateral greater  saphenous veins are patent.    There is a Baker's cyst in the left popliteal fossa measuring 3.2  x 1.5 x 2.6 cm      Impression:         There is no acute deep venous thrombosis in the imaged deep veins  of the bilateral lower extremities.     Electronically signed by:  Manuel Barron MD  7/17/2020 8:18 AM CDT  Workstation: [id number]            Chief Complaint on Day of Discharge: None.    Hospital Course:  Patient was admitted for acute pulmonary embolism and started on anticoagulation with Lovenox/warfarin bridge with routine monitoring of INR. Duplex ultrasound both legs was negative for DVTs.  Echocardiogram showed:  · Estimated EF = 65%.  · Left ventricular systolic function is normal.  · Left ventricular diastolic dysfunction (grade I) consistent with impaired relaxation  INR was subtherapeutic at 1.51 but patient insisted on being discharged and to continue Lovenox with warfarin bridge as outpatient.  She will be referred to the Coumadin clinic and will have first INR check in a.m.    She did complete a course of IV antibiotics secondary to left lower lobe pneumonia and was asymptomatic on discharge.  Blood culture showed no growth and Legionella and strep screens were unremarkable.  She also had hyponatremia which did resolve with isotonic saline.    She was continued on her outpatient medications for hypothyroidism and rheumatoid arthritis.        Condition on Discharge: Stable and improved.    Physical Exam on Discharge:  /72 (BP Location: Right arm, Patient Position: Lying)   Pulse 102   Temp 97.6 °F (36.4 °C) (Oral)   Resp 18   Ht 163 cm (64.17\")   Wt 120 kg (265 lb 6.4 oz)   SpO2 92%   BMI 45.31 kg/m²   Physical Exam   Constitutional: She is oriented to person, place, and time. She appears well-developed " and well-nourished. She is cooperative. No distress.   Patient is morbidly obese and has a BMI of 45.31   HENT:   Head: Normocephalic and atraumatic.   Right Ear: External ear normal.   Left Ear: External ear normal.   Nose: Nose normal.   Mouth/Throat: Oropharynx is clear and moist.   Eyes: Pupils are equal, round, and reactive to light. Conjunctivae and EOM are normal.   Neck: Normal range of motion. Neck supple. No JVD present. No thyromegaly present.   Cardiovascular: Normal rate, regular rhythm, normal heart sounds and intact distal pulses. Exam reveals no gallop and no friction rub.   No murmur heard.  Pulmonary/Chest: Effort normal and breath sounds normal. No stridor. No respiratory distress. She has no wheezes. She has no rales. She exhibits no tenderness.   Abdominal: Soft. Bowel sounds are normal. She exhibits no distension and no mass. There is no tenderness. There is no rebound and no guarding. No hernia.   Musculoskeletal: Normal range of motion. She exhibits no edema, tenderness or deformity.   Neurological: She is alert and oriented to person, place, and time. She has normal reflexes. No cranial nerve deficit or sensory deficit. She exhibits normal muscle tone. Coordination normal.   Skin: Skin is warm and dry. No rash noted. She is not diaphoretic. No erythema. No pallor.   Psychiatric: She has a normal mood and affect. Her behavior is normal. Judgment and thought content normal.   Nursing note and vitals reviewed.        Discharge Disposition:  Home or Self Care    Discharge Medications:     Discharge Medications      New Medications      Instructions Start Date   enoxaparin 120 MG/0.8ML solution syringe  Commonly known as:  LOVENOX   1 mg/kg (120 mg), Subcutaneous, Every 12 Hours      warfarin 10 MG tablet  Commonly known as:  COUMADIN   DVT/PE         Continue These Medications      Instructions Start Date   folic acid 1 MG tablet  Commonly known as:  FOLVITE   1 mg, Oral, Daily       levothyroxine 88 MCG tablet  Commonly known as:  SYNTHROID, LEVOTHROID   88 mcg, Oral, Daily      predniSONE 5 MG tablet  Commonly known as:  DELTASONE   5 mg, Oral, Every Other Day             Discharge Diet: Heart healthy.    Activity at Discharge: As tolerated.    Discharge Care Plan/Instructions: Patient has been advised to take her medications as prescribed and to return to the emergency room in the event of any worsening symptoms.    Follow-up Appointments:   Follow-up with primary care provider within 1 week.    Test Results Pending at Discharge:     Pradip Castrejon MD  07/23/20  10:52    Time: 35 minutes.

## 2020-07-23 NOTE — PLAN OF CARE
Problem: Patient Care Overview  Goal: Plan of Care Review  7/23/2020 0242 by Morena Dumont RN  Outcome: Ongoing (interventions implemented as appropriate)

## 2020-07-23 NOTE — PLAN OF CARE
Problem: Patient Care Overview  Goal: Plan of Care Review  Flowsheets  Taken 7/23/2020 0241  Progress: improving  Taken 7/22/2020 2104  Plan of Care Reviewed With: patient

## 2020-07-24 ENCOUNTER — READMISSION MANAGEMENT (OUTPATIENT)
Dept: CALL CENTER | Facility: HOSPITAL | Age: 50
End: 2020-07-24

## 2020-07-24 NOTE — OUTREACH NOTE
Prep Survey      Responses   [facility] facility patient discharged from?  [facility]   Is LACE score < 7 ?  No   Eligibility  Readm Mgmt   Discharge diagnosis  Pulmonary embolism   COVID-19 Test Status  Negative   Does the patient have one of the following disease processes/diagnoses(primary or secondary)?  Other   Does the patient have Home health ordered?  No   Is there a DME ordered?  No   Comments regarding appointments  per AVS   Medication alerts for this patient  started on lovenox and coumadin   Prep survey completed?  Yes          Aimee Prater RN

## 2020-07-27 ENCOUNTER — READMISSION MANAGEMENT (OUTPATIENT)
Dept: CALL CENTER | Facility: HOSPITAL | Age: 50
End: 2020-07-27

## 2020-07-27 NOTE — OUTREACH NOTE
Medical Week 1 Survey      Responses   [facility] patient discharged from?  [place]   COVID-19 Test Status  Negative   Does the patient have one of the following disease processes/diagnoses(primary or secondary)?  Other   Is there a successful TCM telephone encounter documented?  No   Week 1 attempt successful?  No   Unsuccessful attempts  Attempt 1          Grace Gorman, RN

## 2020-07-29 ENCOUNTER — READMISSION MANAGEMENT (OUTPATIENT)
Dept: CALL CENTER | Facility: HOSPITAL | Age: 50
End: 2020-07-29

## 2020-07-29 NOTE — OUTREACH NOTE
Medical Week 1 Survey      Responses   [facility] patient discharged from?  [place]   COVID-19 Test Status  Negative   Does the patient have one of the following disease processes/diagnoses(primary or secondary)?  Other   Is there a successful TCM telephone encounter documented?  No   Week 1 attempt successful?  No   Unsuccessful attempts  Attempt 2          Davina Rubin RN

## 2020-07-31 ENCOUNTER — READMISSION MANAGEMENT (OUTPATIENT)
Dept: CALL CENTER | Facility: HOSPITAL | Age: 50
End: 2020-07-31

## 2020-07-31 NOTE — OUTREACH NOTE
Medical Week 1 Survey      Responses   [facility] patient discharged from?  [place]   COVID-19 Test Status  Negative   Does the patient have one of the following disease processes/diagnoses(primary or secondary)?  Other   Is there a successful TCM telephone encounter documented?  No   Week 1 attempt successful?  Yes   Call start time  1404   Revoke  Decline to participate   Call end time  1409          Krysta Chauhan, RN

## 2020-11-12 ENCOUNTER — CONSULT (OUTPATIENT)
Dept: ONCOLOGY | Facility: CLINIC | Age: 50
End: 2020-11-12

## 2020-11-12 ENCOUNTER — LAB (OUTPATIENT)
Dept: ONCOLOGY | Facility: HOSPITAL | Age: 50
End: 2020-11-12

## 2020-11-12 VITALS
BODY MASS INDEX: 45.24 KG/M2 | TEMPERATURE: 96.4 F | HEART RATE: 92 BPM | SYSTOLIC BLOOD PRESSURE: 174 MMHG | DIASTOLIC BLOOD PRESSURE: 95 MMHG | RESPIRATION RATE: 18 BRPM | WEIGHT: 265 LBS

## 2020-11-12 DIAGNOSIS — E66.01 MORBID OBESITY (HCC): ICD-10-CM

## 2020-11-12 DIAGNOSIS — I26.99 ACUTE PULMONARY EMBOLISM WITHOUT ACUTE COR PULMONALE, UNSPECIFIED PULMONARY EMBOLISM TYPE (HCC): ICD-10-CM

## 2020-11-12 DIAGNOSIS — I26.99 ACUTE PULMONARY EMBOLISM WITHOUT ACUTE COR PULMONALE, UNSPECIFIED PULMONARY EMBOLISM TYPE (HCC): Primary | ICD-10-CM

## 2020-11-12 PROCEDURE — 81240 F2 GENE: CPT

## 2020-11-12 PROCEDURE — G0463 HOSPITAL OUTPT CLINIC VISIT: HCPCS | Performed by: INTERNAL MEDICINE

## 2020-11-12 PROCEDURE — 81241 F5 GENE: CPT

## 2020-11-12 PROCEDURE — 99204 OFFICE O/P NEW MOD 45 MIN: CPT | Performed by: INTERNAL MEDICINE

## 2020-11-12 NOTE — PROGRESS NOTES
REASON FOR CONSULTATION:  Pulmonary embolism   Provide an opinion on any further workup or treatment                             REQUESTING PHYSICIAN:  Javed Thomson MD      RECORDS OBTAINED:  Records of the patients history including those obtained from the referring provider were reviewed and summarized in detail.    History of Present Illness     This is a pleasant 50-year-old female who was seen in consultation at the request of Dr. Thomson for evaluation of newly diagnosed embolism.  Patient has past medical history of obesity, rheumatoid arthritis, hypothyroidism.  She is unfortunately poor historian and most of the history was obtained by reviewing old medical records.  Patient was admitted to our hospital on June 29, 2020 with sepsis, hypotension, acute renal failure.  She was treated with IV antibiotics and was later diagnosed with ehrliosis.  She was in intensive care unit for 1 day in general hospital floor for 2 days and was subsequently discharged home on July 1, 2020.  Patient tells me during this hospitalization she did not receive any pharmacological DVT prophylaxis.  Subsequently, She developed left-sided chest pain July 16, 2020 and went to emergency room as well noticed to have elevated D-dimer.  Subsequently CT angiogram chest showed a large central emboli in the left lung.  She was treated with Lovenox bridge followed by Coumadin and currently is taking Coumadin without any bleeding issues.  She denies any prior personal history of VTE.  No family history of any hematological disorder.  I been asked to assist with evaluation and management of her pulmonary embolism.            Past Medical History:   Diagnosis Date   • Carpal tunnel syndrome    • Chronic bronchitis (CMS/HCC)    • Hypothyroidism    • Obesity    • RA (rheumatoid arthritis) (CMS/HCC)         Past Surgical History:   Procedure Laterality Date   • BONE RESECTION, RIB     • ENDOMETRIAL ABLATION     • VEIN BYPASS SURGERY           Current Outpatient Medications on File Prior to Visit   Medication Sig Dispense Refill   • folic acid (FOLVITE) 1 MG tablet Take 1 mg by mouth Daily.     • levothyroxine (SYNTHROID, LEVOTHROID) 88 MCG tablet Take 88 mcg by mouth Daily.     • predniSONE (DELTASONE) 5 MG tablet Take 5 mg by mouth Every Other Day.     • warfarin (COUMADIN) 10 MG tablet Take 1 tablet by mouth Every Night. 30 tablet 1     No current facility-administered medications on file prior to visit.         ALLERGIES:  No Known Allergies     Social History     Socioeconomic History   • Marital status:      Spouse name: Not on file   • Number of children: Not on file   • Years of education: Not on file   • Highest education level: Not on file   Tobacco Use   • Smoking status: Current Every Day Smoker     Packs/day: 1.50     Types: Cigarettes   • Smokeless tobacco: Never Used   Substance and Sexual Activity   • Alcohol use: Not Currently   • Drug use: Not Currently   • Sexual activity: Not Currently        Family History   Problem Relation Age of Onset   • Hypertension Father         Review of Systems       CONSTITUTIONAL: No weight loss, fever, chills, weakness or fatigue.  HEENT: Eyes: No visual loss, blurred vision, double vision or yellow sclerae. Ears, Nose, Throat: No hearing loss, sneezing, congestion, runny nose or sore throat.  SKIN: No rash or itching.  CARDIOVASCULAR: No chest pain, chest pressure or chest discomfort. No palpitations or edema.  RESPIRATORY: No shortness of breath, cough or sputum.  GASTROINTESTINAL: No anorexia, nausea, vomiting or diarrhea. No abdominal pain or blood.  GENITOURINARY: Negative for urgency, frequency or dysuria.   NEUROLOGICAL: No headache, dizziness, syncope, paralysis, ataxia, numbness or tingling in the extremities. No change in bowel or bladder control.  MUSCULOSKELETAL: chronic joint pain + .  HEMATOLOGIC: No anemia, bleeding or bruising.  LYMPHATICS: No enlarged nodes. No history of  splenectomy.  PSYCHIATRIC: No history of depression or anxiety.  ENDOCRINOLOGIC: No reports of sweating, cold or heat intolerance. No polyuria or polydipsia.  ALLERGIES: No history of asthma, hives, eczema or rhinitis.      Objective     Vitals:    11/12/20 1443   BP: 174/95   Pulse: 92   Resp: 18   Temp: 96.4 °F (35.8 °C)         Physical Exam      GENERAL: Alert, awake, oriented.  Well dressed.  Not in apparent distress. Vitals as above.   HEAD: Normocephalic, atraumatic.   EYES: PERRL, EOMI. vision is grossly intact.  NECK: Supple, no adenopathy or thyromegaly.   THROAT: Normal oral cavity and pharynx. No inflammation, swelling, exudate, or lesions.  CARDIAC: Normal S1 and S2. No S3, S4 or murmurs. Rhythm is regular.  Extremities are warm and well perfused.   LUNGS: Clear to auscultation and percussion without rales, rhonchi, wheezing or diminished breath sounds.  ABDOMEN: Positive bowel sounds. Soft, obese, nontender. No guarding or rebound. No masses.  BACK:  No bony tenderness.   EXTREMITIES: No significant deformity or joint abnormality.  Peripheral pulses intact.  Varicosities + with lymphedema + .  SKIN: No rash or bruising.  NEUROLOGICAL: Grossly non-focal exam. No focal weakness. Gait: Normal.   PSYCH: Mood and affect normal. No hallucination or suicidal thoughts.   LYMPHATIC: No cervical, supraclavicular or axillary adenopathy.       RECENT LABS:Independently reviewed and summarized  Hematology WBC   Date Value Ref Range Status   07/23/2020 6.06 3.40 - 10.80 10*3/mm3 Final   05/23/2018 7.5 4.0 - 11.0 10*3/uL Final     RBC   Date Value Ref Range Status   07/23/2020 4.04 3.77 - 5.28 10*6/mm3 Final   05/23/2018 5.24 (H) 4.15 - 4.87 10*6/uL Final     Hemoglobin   Date Value Ref Range Status   07/23/2020 11.6 (L) 12.0 - 15.9 g/dL Final   05/23/2018 15 (H) 12.7 - 14.7 g/dL Final     Hematocrit   Date Value Ref Range Status   07/23/2020 35.1 34.0 - 46.6 % Final   05/23/2018 46.3 (H) 37.9 - 43.9 % Final      Platelets   Date Value Ref Range Status   07/23/2020 358 140 - 450 10*3/mm3 Final   05/23/2018 263 150 - 450 10*3/uL Final            Imaging (independently reviewed and summarized):   CT angiogram chest pulmonary embolism protocol July 16, 2020 showed large central emboli in the proximal left lower lobe pulmonary artery.  No prior comparison available.    I have reviewed old records and summarized them in HPI as well as assessment and plan section of this note.     Sid Cm reports a pain score of 0.  Given her pain assessment as noted, treatment options were discussed and the following options were decided upon as a follow-up plan to address the patient's pain: continuation of current treatment plan for pain.    Patient screened positive for depression based on a PHQ-9 score of 0 on 11/12/2020. Follow-up recommendations include: Suicide Risk Assessment performed.      Diagnosis:   (1) Pulmonary embolism   (2) Morbid obesity     All are new diagnosis/problems for me.     Assessment/Plan     (1) Pulmonary embolism     Patient with what appears to be provoked episode of pulmonary embolism after hospitalization for sepsis .  She does not have any other prior history of venous or arterial thrombosis.  No family history of any venous thromboembolism.  Had a very lengthy discussion with patient about her diagnosis and treatment options.  She is currently on Coumadin and is tolerating treatment well.  Most patient with provoked VTE has low risk of recurrence (5% at 1 year and 15% at 5 years) which does not justify lifelong anticoagulation.  I recommend that she undergo 6 months of anticoagulation.  I will check a D-dimer, factor V Leiden and prothrombin gene mutation testing.  Persistently elevated D-dimer level could potentially predict higher risk of recurrence however I would like to trend this while she is on Coumadin and when she goes of Coumadin.  I will also likely obtain ultrasound lower extremity  prior to discontinuation of Coumadin.    Discussed with patient if she develops any other VTE most likely she will need lifelong anticoagulation.  I also educated and counseled her about receiving DVT prophylaxis during the period of any medical illness or surgery.      (2) Morbid obesity   Body mass index is 45.24 kg/m².  Counseled about diet, exercise and weight loss.     I have spent > 45 minutes times face to face with the patient and more than 50% of time was spent on counseling/coordinating care.       Thank you for involving me in Ms Cm's care.     Please call us if any questions/concerns.     Pablito Vick MD   Hematology Oncology

## 2020-11-13 DIAGNOSIS — I26.99 ACUTE PULMONARY EMBOLISM WITHOUT ACUTE COR PULMONALE, UNSPECIFIED PULMONARY EMBOLISM TYPE (HCC): Primary | ICD-10-CM

## 2020-11-16 ENCOUNTER — APPOINTMENT (OUTPATIENT)
Dept: ONCOLOGY | Facility: HOSPITAL | Age: 50
End: 2020-11-16

## 2020-11-18 ENCOUNTER — LAB (OUTPATIENT)
Dept: LAB | Facility: HOSPITAL | Age: 50
End: 2020-11-18

## 2020-11-18 DIAGNOSIS — I26.99 ACUTE PULMONARY EMBOLISM WITHOUT ACUTE COR PULMONALE, UNSPECIFIED PULMONARY EMBOLISM TYPE (HCC): ICD-10-CM

## 2020-11-18 LAB — F2 GENE MUT ANL BLD/T: NORMAL

## 2020-11-18 PROCEDURE — 85379 FIBRIN DEGRADATION QUANT: CPT

## 2020-11-19 LAB
D-DIMER, QUANTITATIVE (MAD,POW, STR): <270 NG/ML (FEU) (ref 0–470)
F5 GENE MUT ANL BLD/T: ABNORMAL

## 2020-12-10 ENCOUNTER — OFFICE VISIT (OUTPATIENT)
Dept: ONCOLOGY | Facility: CLINIC | Age: 50
End: 2020-12-10

## 2020-12-10 VITALS
DIASTOLIC BLOOD PRESSURE: 69 MMHG | HEART RATE: 80 BPM | HEIGHT: 64 IN | WEIGHT: 267.8 LBS | SYSTOLIC BLOOD PRESSURE: 130 MMHG | TEMPERATURE: 97.5 F | RESPIRATION RATE: 18 BRPM | BODY MASS INDEX: 45.72 KG/M2

## 2020-12-10 DIAGNOSIS — I26.99 ACUTE PULMONARY EMBOLISM WITHOUT ACUTE COR PULMONALE, UNSPECIFIED PULMONARY EMBOLISM TYPE (HCC): Primary | ICD-10-CM

## 2020-12-10 DIAGNOSIS — D68.51 FACTOR V LEIDEN MUTATION (HCC): ICD-10-CM

## 2020-12-10 PROCEDURE — 99214 OFFICE O/P EST MOD 30 MIN: CPT | Performed by: INTERNAL MEDICINE

## 2020-12-10 PROCEDURE — G0463 HOSPITAL OUTPT CLINIC VISIT: HCPCS | Performed by: INTERNAL MEDICINE

## 2020-12-10 NOTE — PROGRESS NOTES
Subjective     Sid Cm in follow-up for pulmonary embolism.  She remains on Coumadin without any new bleeding or thrombosis concern.  Result of thrombophilia testing discussed which came back as factor V Leiden heterozygous mutation.  Recommend she finish total 6 months of anticoagulation with Coumadin.  Most patient with provoked VTE has low risk of recurrence (5% at 1 year and 15% at 5 years) which does not justify lifelong anticoagulation.  However, she should consider prophylactic anticoagulation during high risk situations such as prolonged immobility, hospitalization, surgery.    Discussed at length with patient and she was in agreement.    ROS as below.     Past Medical History, Past Surgical History, Social History, Family History have been reviewed and are without significant changes except as mentioned.    Review of Systems       CONSTITUTIONAL: No weight loss, fever, chills, weakness or fatigue.  HEENT: Eyes: No visual loss, blurred vision, double vision or yellow sclerae. Ears, Nose, Throat: No hearing loss, sneezing, congestion, runny nose or sore throat.  SKIN: No rash or itching.  CARDIOVASCULAR: No chest pain, chest pressure or chest discomfort. No palpitations or edema.  RESPIRATORY: No shortness of breath, cough or sputum.  GASTROINTESTINAL: No anorexia, nausea, vomiting or diarrhea. No abdominal pain or blood.  GENITOURINARY: Negative for urgency, frequency or dysuria.   NEUROLOGICAL: No headache, dizziness, syncope, paralysis, ataxia, numbness or tingling in the extremities. No change in bowel or bladder control.  MUSCULOSKELETAL: Chronic joint pain + .  HEMATOLOGIC: No anemia, bleeding or bruising.  LYMPHATICS: No enlarged nodes. No history of splenectomy.  PSYCHIATRIC: No history of depression or anxiety.  ENDOCRINOLOGIC: No reports of sweating, cold or heat intolerance. No polyuria or polydipsia.  ALLERGIES: No history of asthma, hives, eczema or rhinitis.      Medications:  The  "current medication list was reviewed in the EMR    ALLERGIES:  No Known Allergies    Objective      Vitals:    12/10/20 1137   BP: 130/69   Pulse: 80   Resp: 18   Temp: 97.5 °F (36.4 °C)   TempSrc: Temporal   Weight: 121 kg (267 lb 12.8 oz)   Height: 163 cm (64.17\")   PainSc: 0-No pain     Current Status 12/10/2020   ECOG score 0       Physical Exam      GENERAL: Alert, awake, oriented.  Well dressed.  Not in apparent distress. Vitals as above.   HEAD: Normocephalic, atraumatic.   EYES: PERRL, EOMI. vision is grossly intact.  NECK: Supple, no adenopathy or thyromegaly.   THROAT: Normal oral cavity and pharynx. No inflammation, swelling, exudate, or lesions.  CARDIAC: Normal S1 and S2. No S3, S4 or murmurs. Rhythm is regular.  Extremities are warm and well perfused.   LUNGS: Clear to auscultation and percussion without rales, rhonchi, wheezing or diminished breath sounds.  ABDOMEN: Positive bowel sounds. Soft, nondistended, nontender. No guarding or rebound. No masses.  BACK:  No bony tenderness.   EXTREMITIES: No significant deformity or joint abnormality.  Peripheral pulses intact. No varicosities.  SKIN: No rash or bruising.  NEUROLOGICAL: Grossly non-focal exam. No focal weakness. Gait: Normal.   PSYCH: Mood and affect normal. No hallucination or suicidal thoughts.   LYMPHATIC: No cervical, supraclavicular or axillary adenopathy.       RECENT LABS:Independently reviewed and summarized  Hematology WBC   Date Value Ref Range Status   07/23/2020 6.06 3.40 - 10.80 10*3/mm3 Final   05/23/2018 7.5 4.0 - 11.0 10*3/uL Final     RBC   Date Value Ref Range Status   07/23/2020 4.04 3.77 - 5.28 10*6/mm3 Final   05/23/2018 5.24 (H) 4.15 - 4.87 10*6/uL Final     Hemoglobin   Date Value Ref Range Status   07/23/2020 11.6 (L) 12.0 - 15.9 g/dL Final   05/23/2018 15 (H) 12.7 - 14.7 g/dL Final     Hematocrit   Date Value Ref Range Status   07/23/2020 35.1 34.0 - 46.6 % Final   05/23/2018 46.3 (H) 37.9 - 43.9 % Final     Platelets "   Date Value Ref Range Status   07/23/2020 358 140 - 450 10*3/mm3 Final   05/23/2018 263 150 - 450 10*3/uL Final              Diagnosis:   (1) Pulmonary embolism   (2) Factor V leiden, heterozygous - new issue     Assessment/Plan     Patient with history of pulmonary embolism that was provoked after hospitalization for sepsis.  No prior history of thrombosis before this episode.  Thrombophilia testing negative except for factor V Leiden heterozygous status.  Her D-dimer is normal.    Recommend she finish total 6 months of anticoagulation with Coumadin.  Most patient with provoked VTE has low risk of recurrence (5% at 1 year and 15% at 5 years) which does not justify lifelong anticoagulation.  However, she should consider prophylactic anticoagulation during high risk situations such as prolonged immobility, hospitalization, surgery.    Discussed at length with patient and she was in agreement.    Recommendations:   - Discontinue coumadin next month ( 6 months of total AC).   - Monitor for recurrent VTE.   - Prophylactic anticoagulation in high risk situation (immobility/hospitalization/surgery/long haul travel).     I have spent > 25 minutes times face to face with the patient and more than 50% of time was spent on counseling/coordinating care.         12/10/2020      CC:

## 2022-08-12 ENCOUNTER — CONSULT (OUTPATIENT)
Dept: ONCOLOGY | Facility: CLINIC | Age: 52
End: 2022-08-12

## 2022-08-12 ENCOUNTER — LAB (OUTPATIENT)
Dept: ONCOLOGY | Facility: HOSPITAL | Age: 52
End: 2022-08-12

## 2022-08-12 VITALS
OXYGEN SATURATION: 92 % | SYSTOLIC BLOOD PRESSURE: 139 MMHG | BODY MASS INDEX: 47.46 KG/M2 | HEART RATE: 89 BPM | WEIGHT: 278 LBS | TEMPERATURE: 97.6 F | DIASTOLIC BLOOD PRESSURE: 88 MMHG | RESPIRATION RATE: 18 BRPM

## 2022-08-12 DIAGNOSIS — D75.1 POLYCYTHEMIA: ICD-10-CM

## 2022-08-12 DIAGNOSIS — I27.82 CHRONIC PULMONARY EMBOLISM WITHOUT ACUTE COR PULMONALE, UNSPECIFIED PULMONARY EMBOLISM TYPE: Primary | ICD-10-CM

## 2022-08-12 DIAGNOSIS — D68.51 FACTOR V LEIDEN MUTATION: ICD-10-CM

## 2022-08-12 LAB
DEPRECATED RDW RBC AUTO: 43.1 FL (ref 37–54)
ERYTHROCYTE [DISTWIDTH] IN BLOOD BY AUTOMATED COUNT: 13.9 % (ref 12.3–15.4)
HCT VFR BLD AUTO: 45.6 % (ref 34–46.6)
HGB BLD-MCNC: 15.9 G/DL (ref 12–15.9)
MCH RBC QN AUTO: 29.7 PG (ref 26.6–33)
MCHC RBC AUTO-ENTMCNC: 34.9 G/DL (ref 31.5–35.7)
MCV RBC AUTO: 85.1 FL (ref 79–97)
PLATELET # BLD AUTO: 233 10*3/MM3 (ref 140–450)
PMV BLD AUTO: 9.6 FL (ref 6–12)
RBC # BLD AUTO: 5.36 10*6/MM3 (ref 3.77–5.28)
WBC NRBC COR # BLD: 16.08 10*3/MM3 (ref 3.4–10.8)

## 2022-08-12 PROCEDURE — 82668 ASSAY OF ERYTHROPOIETIN: CPT

## 2022-08-12 PROCEDURE — 85027 COMPLETE CBC AUTOMATED: CPT

## 2022-08-12 PROCEDURE — G0463 HOSPITAL OUTPT CLINIC VISIT: HCPCS | Performed by: INTERNAL MEDICINE

## 2022-08-12 PROCEDURE — 99213 OFFICE O/P EST LOW 20 MIN: CPT | Performed by: INTERNAL MEDICINE

## 2022-08-12 RX ORDER — PROMETHAZINE HYDROCHLORIDE AND CODEINE PHOSPHATE 6.25; 1 MG/5ML; MG/5ML
SYRUP ORAL
COMMUNITY
Start: 2022-05-12 | End: 2022-08-12

## 2022-08-12 RX ORDER — LEVOTHYROXINE SODIUM 175 UG/1
TABLET ORAL
COMMUNITY
Start: 2022-08-03 | End: 2022-08-12 | Stop reason: SDUPTHER

## 2022-08-12 RX ORDER — LEVOTHYROXINE SODIUM 175 UG/1
175 TABLET ORAL DAILY
COMMUNITY
Start: 2022-04-07 | End: 2023-04-08

## 2022-08-12 RX ORDER — HYDROCODONE BITARTRATE AND ACETAMINOPHEN 7.5; 325 MG/1; MG/1
TABLET ORAL
COMMUNITY
Start: 2022-07-26

## 2022-08-12 RX ORDER — ASPIRIN 325 MG
325 TABLET ORAL DAILY
COMMUNITY

## 2022-08-12 RX ORDER — ALBUTEROL SULFATE 2.5 MG/3ML
2.5 SOLUTION RESPIRATORY (INHALATION)
COMMUNITY
Start: 2022-03-13 | End: 2023-03-14

## 2022-08-12 RX ORDER — HYDROCODONE BITARTRATE AND ACETAMINOPHEN 7.5; 325 MG/1; MG/1
1 TABLET ORAL
COMMUNITY
Start: 2022-07-26

## 2022-08-12 RX ORDER — GUAIFENESIN 600 MG/1
TABLET, EXTENDED RELEASE ORAL
COMMUNITY
Start: 2022-05-12 | End: 2022-08-12

## 2022-08-12 RX ORDER — ALBUTEROL SULFATE 90 UG/1
2 AEROSOL, METERED RESPIRATORY (INHALATION)
COMMUNITY
Start: 2022-03-13 | End: 2023-03-14

## 2022-08-12 RX ORDER — MELOXICAM 15 MG/1
15 TABLET ORAL DAILY
COMMUNITY
Start: 2022-07-28

## 2022-08-12 RX ORDER — ALENDRONATE SODIUM 35 MG/1
35 TABLET ORAL
COMMUNITY
Start: 2022-04-06 | End: 2022-08-12

## 2022-08-12 RX ORDER — MELOXICAM 15 MG/1
TABLET ORAL
COMMUNITY
Start: 2022-07-28

## 2022-08-12 RX ORDER — AMOXICILLIN AND CLAVULANATE POTASSIUM 875; 125 MG/1; MG/1
TABLET, FILM COATED ORAL
COMMUNITY
Start: 2022-05-12 | End: 2022-08-12

## 2022-08-12 RX ORDER — ALENDRONATE SODIUM 35 MG/1
TABLET ORAL
COMMUNITY
Start: 2022-08-03 | End: 2022-08-12

## 2022-08-15 LAB — EPO SERPL-ACNC: 6.5 MIU/ML (ref 2.6–18.5)

## 2022-08-16 ENCOUNTER — TELEPHONE (OUTPATIENT)
Dept: ONCOLOGY | Facility: HOSPITAL | Age: 52
End: 2022-08-16

## 2022-08-16 NOTE — TELEPHONE ENCOUNTER
----- Message from Yakov Vick MD sent at 8/15/2022  4:06 PM CDT -----  Let patient know her blood test was normal.  Continue follow-up with Dr. Thomson.

## 2022-08-23 ENCOUNTER — HOSPITAL ENCOUNTER (EMERGENCY)
Facility: HOSPITAL | Age: 52
Discharge: HOME | End: 2022-08-24
Attending: EMERGENCY MEDICINE | Admitting: EMERGENCY MEDICINE

## 2022-08-23 DIAGNOSIS — K29.00 ACUTE GASTRITIS, PRESENCE OF BLEEDING UNSPECIFIED, UNSPECIFIED GASTRITIS TYPE: ICD-10-CM

## 2022-08-23 DIAGNOSIS — K21.9 GASTROESOPHAGEAL REFLUX DISEASE, UNSPECIFIED WHETHER ESOPHAGITIS PRESENT: Primary | ICD-10-CM

## 2022-08-23 PROCEDURE — 93010 ELECTROCARDIOGRAM REPORT: CPT | Performed by: INTERNAL MEDICINE

## 2022-08-23 PROCEDURE — 83690 ASSAY OF LIPASE: CPT | Performed by: EMERGENCY MEDICINE

## 2022-08-23 PROCEDURE — 93005 ELECTROCARDIOGRAM TRACING: CPT | Performed by: EMERGENCY MEDICINE

## 2022-08-23 PROCEDURE — 80053 COMPREHEN METABOLIC PANEL: CPT

## 2022-08-23 PROCEDURE — 99284 EMERGENCY DEPT VISIT MOD MDM: CPT

## 2022-08-23 PROCEDURE — 83880 ASSAY OF NATRIURETIC PEPTIDE: CPT

## 2022-08-23 PROCEDURE — 93005 ELECTROCARDIOGRAM TRACING: CPT

## 2022-08-23 PROCEDURE — 85025 COMPLETE CBC W/AUTO DIFF WBC: CPT

## 2022-08-23 PROCEDURE — 84484 ASSAY OF TROPONIN QUANT: CPT

## 2022-08-23 RX ORDER — ASPIRIN 325 MG
325 TABLET ORAL ONCE
Status: DISCONTINUED | OUTPATIENT
Start: 2022-08-23 | End: 2022-08-24

## 2022-08-23 RX ORDER — SODIUM CHLORIDE 0.9 % (FLUSH) 0.9 %
10 SYRINGE (ML) INJECTION AS NEEDED
Status: DISCONTINUED | OUTPATIENT
Start: 2022-08-23 | End: 2022-08-24 | Stop reason: HOSPADM

## 2022-08-24 ENCOUNTER — APPOINTMENT (OUTPATIENT)
Dept: GENERAL RADIOLOGY | Facility: HOSPITAL | Age: 52
End: 2022-08-24

## 2022-08-24 VITALS
RESPIRATION RATE: 18 BRPM | OXYGEN SATURATION: 96 % | HEIGHT: 64 IN | HEART RATE: 92 BPM | TEMPERATURE: 97.9 F | WEIGHT: 280 LBS | SYSTOLIC BLOOD PRESSURE: 139 MMHG | BODY MASS INDEX: 47.8 KG/M2 | DIASTOLIC BLOOD PRESSURE: 73 MMHG

## 2022-08-24 LAB
ALBUMIN SERPL-MCNC: 4 G/DL (ref 3.5–5.2)
ALBUMIN/GLOB SERPL: 1.4 G/DL
ALP SERPL-CCNC: 95 U/L (ref 39–117)
ALT SERPL W P-5'-P-CCNC: 21 U/L (ref 1–33)
ANION GAP SERPL CALCULATED.3IONS-SCNC: 12 MMOL/L (ref 5–15)
AST SERPL-CCNC: 21 U/L (ref 1–32)
BASOPHILS # BLD AUTO: 0.06 10*3/MM3 (ref 0–0.2)
BASOPHILS NFR BLD AUTO: 0.5 % (ref 0–1.5)
BILIRUB SERPL-MCNC: 0.3 MG/DL (ref 0–1.2)
BUN SERPL-MCNC: 16 MG/DL (ref 6–20)
BUN/CREAT SERPL: 17.4 (ref 7–25)
CALCIUM SPEC-SCNC: 9.1 MG/DL (ref 8.6–10.5)
CHLORIDE SERPL-SCNC: 102 MMOL/L (ref 98–107)
CO2 SERPL-SCNC: 26 MMOL/L (ref 22–29)
CREAT SERPL-MCNC: 0.92 MG/DL (ref 0.57–1)
DEPRECATED RDW RBC AUTO: 42.3 FL (ref 37–54)
EGFRCR SERPLBLD CKD-EPI 2021: 75.5 ML/MIN/1.73
EOSINOPHIL # BLD AUTO: 0.5 10*3/MM3 (ref 0–0.4)
EOSINOPHIL NFR BLD AUTO: 3.8 % (ref 0.3–6.2)
ERYTHROCYTE [DISTWIDTH] IN BLOOD BY AUTOMATED COUNT: 13.6 % (ref 12.3–15.4)
GLOBULIN UR ELPH-MCNC: 2.8 GM/DL
GLUCOSE SERPL-MCNC: 111 MG/DL (ref 65–99)
HCT VFR BLD AUTO: 45 % (ref 34–46.6)
HGB BLD-MCNC: 15.9 G/DL (ref 12–15.9)
HOLD SPECIMEN: NORMAL
HOLD SPECIMEN: NORMAL
IMM GRANULOCYTES # BLD AUTO: 0.07 10*3/MM3 (ref 0–0.05)
IMM GRANULOCYTES NFR BLD AUTO: 0.5 % (ref 0–0.5)
LIPASE SERPL-CCNC: 78 U/L (ref 13–60)
LYMPHOCYTES # BLD AUTO: 3.04 10*3/MM3 (ref 0.7–3.1)
LYMPHOCYTES NFR BLD AUTO: 23.1 % (ref 19.6–45.3)
MCH RBC QN AUTO: 30.3 PG (ref 26.6–33)
MCHC RBC AUTO-ENTMCNC: 35.3 G/DL (ref 31.5–35.7)
MCV RBC AUTO: 85.9 FL (ref 79–97)
MONOCYTES # BLD AUTO: 0.97 10*3/MM3 (ref 0.1–0.9)
MONOCYTES NFR BLD AUTO: 7.4 % (ref 5–12)
NEUTROPHILS NFR BLD AUTO: 64.7 % (ref 42.7–76)
NEUTROPHILS NFR BLD AUTO: 8.52 10*3/MM3 (ref 1.7–7)
NRBC BLD AUTO-RTO: 0 /100 WBC (ref 0–0.2)
NT-PROBNP SERPL-MCNC: 137.8 PG/ML (ref 0–900)
PLATELET # BLD AUTO: 245 10*3/MM3 (ref 140–450)
PMV BLD AUTO: 10.1 FL (ref 6–12)
POTASSIUM SERPL-SCNC: 3.6 MMOL/L (ref 3.5–5.2)
PROT SERPL-MCNC: 6.8 G/DL (ref 6–8.5)
QT INTERVAL: 338 MS
QTC INTERVAL: 411 MS
RBC # BLD AUTO: 5.24 10*6/MM3 (ref 3.77–5.28)
SODIUM SERPL-SCNC: 140 MMOL/L (ref 136–145)
TROPONIN T SERPL-MCNC: <0.01 NG/ML (ref 0–0.03)
WBC NRBC COR # BLD: 13.16 10*3/MM3 (ref 3.4–10.8)
WHOLE BLOOD HOLD COAG: NORMAL
WHOLE BLOOD HOLD SPECIMEN: NORMAL

## 2022-08-24 PROCEDURE — 96374 THER/PROPH/DIAG INJ IV PUSH: CPT

## 2022-08-24 PROCEDURE — 71045 X-RAY EXAM CHEST 1 VIEW: CPT

## 2022-08-24 RX ORDER — ALUMINA, MAGNESIA, AND SIMETHICONE 2400; 2400; 240 MG/30ML; MG/30ML; MG/30ML
15 SUSPENSION ORAL ONCE
Status: COMPLETED | OUTPATIENT
Start: 2022-08-24 | End: 2022-08-24

## 2022-08-24 RX ORDER — SUCRALFATE 1 G/1
1 TABLET ORAL 4 TIMES DAILY
Qty: 28 TABLET | Refills: 0 | Status: SHIPPED | OUTPATIENT
Start: 2022-08-24 | End: 2022-08-31

## 2022-08-24 RX ORDER — PANTOPRAZOLE SODIUM 40 MG/10ML
80 INJECTION, POWDER, LYOPHILIZED, FOR SOLUTION INTRAVENOUS ONCE
Status: COMPLETED | OUTPATIENT
Start: 2022-08-24 | End: 2022-08-24

## 2022-08-24 RX ORDER — LIDOCAINE HYDROCHLORIDE 20 MG/ML
15 SOLUTION OROPHARYNGEAL ONCE
Status: COMPLETED | OUTPATIENT
Start: 2022-08-24 | End: 2022-08-24

## 2022-08-24 RX ADMIN — ALUMINUM HYDROXIDE, MAGNESIUM HYDROXIDE, AND DIMETHICONE 15 ML: 400; 400; 40 SUSPENSION ORAL at 00:39

## 2022-08-24 RX ADMIN — PANTOPRAZOLE SODIUM 80 MG: 40 INJECTION, POWDER, FOR SOLUTION INTRAVENOUS at 00:39

## 2022-08-24 RX ADMIN — LIDOCAINE HYDROCHLORIDE 15 ML: 20 SOLUTION ORAL; TOPICAL at 00:39

## 2022-08-24 NOTE — ED NOTES
per pt chest has been hurting on and off for 3 days, per pt its feels like chest is burning, also burning in back of throat, per pt it worse tonight, and right arm pain.

## 2022-08-24 NOTE — DISCHARGE INSTRUCTIONS
Please return with new or worsening symptoms.  Follow-up with primary care.  Carafate 4 times daily for the next 7 days.  Take 30 minutes before meals and 30 minutes before bedtime.  Ensure that you are taking your other medicines 30 minutes before or after this medicine.  Particularly make sure that you do not take your thyroid medicine at the same time as this medicine.  After completion of the 7-day course start something over-the-counter for reflux such as Nexium.

## 2022-08-24 NOTE — ED PROVIDER NOTES
Subjective   52-year-old female presents the emergency department with sudden onset of chest pain and sore throat.  She does have history of chronic bronchitis but denies any cardiac history.  She reports that started 3 days ago and has been intermittent but worsening.  Tonight she is feeling a burning in the back of her throat.  Denies shortness of breath.  Denies worse with exertion.  Denies fever, chills, vomiting or diarrhea.    Family history, surgical history, social history, current medications and allergies are reviewed with the patient and triage documentation and vitals are reviewed.      History provided by:  Patient   used: No        Review of Systems   Constitutional: Negative for chills, diaphoresis and fever.   HENT: Positive for sore throat. Negative for congestion.    Eyes: Negative for photophobia and visual disturbance.   Respiratory: Negative for cough, shortness of breath and wheezing.    Cardiovascular: Positive for chest pain. Negative for palpitations and leg swelling.   Gastrointestinal: Negative for abdominal pain, diarrhea, nausea and vomiting.   Endocrine: Negative for polydipsia and polyphagia.   Genitourinary: Negative for dysuria, frequency and urgency.   Musculoskeletal: Negative for arthralgias, back pain, myalgias and neck pain.   Skin: Negative for rash and wound.   Allergic/Immunologic: Negative.    Neurological: Negative for weakness, light-headedness and numbness.   Hematological: Negative.    Psychiatric/Behavioral: Negative.        Past Medical History:   Diagnosis Date   • Carpal tunnel syndrome    • Chronic bronchitis (HCC)    • Hypothyroidism    • Obesity    • RA (rheumatoid arthritis) (HCC)        No Known Allergies    Past Surgical History:   Procedure Laterality Date   • BONE RESECTION, RIB     • ENDOMETRIAL ABLATION     • VEIN BYPASS SURGERY         Family History   Problem Relation Age of Onset   • Hypertension Father        Social History      Socioeconomic History   • Marital status:    Tobacco Use   • Smoking status: Current Every Day Smoker     Packs/day: 1.50     Types: Cigarettes   • Smokeless tobacco: Never Used   Substance and Sexual Activity   • Alcohol use: Not Currently   • Drug use: Not Currently   • Sexual activity: Not Currently           Objective   Physical Exam  Vitals and nursing note reviewed.   Constitutional:       General: She is not in acute distress.     Appearance: She is well-developed. She is obese. She is not ill-appearing, toxic-appearing or diaphoretic.   HENT:      Head: Normocephalic.   Eyes:      Pupils: Pupils are equal, round, and reactive to light.   Neck:      Vascular: No JVD.   Cardiovascular:      Rate and Rhythm: Normal rate and regular rhythm.  No extrasystoles are present.     Heart sounds: No murmur heard.  Pulmonary:      Effort: Pulmonary effort is normal. No tachypnea, accessory muscle usage or respiratory distress.   Chest:      Chest wall: No tenderness or crepitus.   Abdominal:      General: Bowel sounds are normal.      Palpations: Abdomen is soft.      Tenderness: There is no abdominal tenderness. There is no guarding or rebound.   Musculoskeletal:         General: Normal range of motion.      Cervical back: Normal range of motion and neck supple.      Right lower leg: No tenderness. No edema.      Left lower leg: No tenderness. No edema.   Skin:     General: Skin is warm.      Capillary Refill: Capillary refill takes less than 2 seconds.   Neurological:      General: No focal deficit present.      Mental Status: She is alert and oriented to person, place, and time.   Psychiatric:         Mood and Affect: Mood normal.         Behavior: Behavior normal.         ECG 12 Lead      Date/Time: 8/24/2022 10:35 PM  Performed by: Sid Sutherland DO  Authorized by: Sid Sutherland DO   Interpreted by physician  Comments: EKG August 23, 2022 at 2235 reveals normal sinus rhythm rate 89 bpm.  T wave  flattening in aVL.  No ST elevation or depression.  No evidence of acute ischemia.  .        HEART Score for Major Cardiac Events - MDCalc  Calculated on Aug 28 2022 4:32 AM  2 points -> Low Score (0-3 points) Risk of MACE of 0.9-1.7%.         ED Course      Labs Reviewed   COMPREHENSIVE METABOLIC PANEL - Abnormal; Notable for the following components:       Result Value    Glucose 111 (*)     All other components within normal limits    Narrative:     GFR Normal >60  Chronic Kidney Disease <60  Kidney Failure <15     CBC WITH AUTO DIFFERENTIAL - Abnormal; Notable for the following components:    WBC 13.16 (*)     Neutrophils, Absolute 8.52 (*)     Monocytes, Absolute 0.97 (*)     Eosinophils, Absolute 0.50 (*)     Immature Grans, Absolute 0.07 (*)     All other components within normal limits   LIPASE - Abnormal; Notable for the following components:    Lipase 78 (*)     All other components within normal limits   TROPONIN (IN-HOUSE) - Normal    Narrative:     Troponin T Reference Range:  <= 0.03 ng/mL-   Negative for AMI  >0.03 ng/mL-     Abnormal for myocardial necrosis.  Clinicians would have to utilize clinical acumen, EKG, Troponin and serial changes to determine if it is an Acute Myocardial Infarction or myocardial injury due to an underlying chronic condition.       Results may be falsely decreased if patient taking Biotin.     BNP (IN-HOUSE) - Normal    Narrative:     Among patients with dyspnea, NT-proBNP is highly sensitive for the detection of acute congestive heart failure. In addition NT-proBNP of <300 pg/ml effectively rules out acute congestive heart failure with 99% negative predictive value.    Results may be falsely decreased if patient taking Biotin.     RAINBOW DRAW    Narrative:     The following orders were created for panel order [place] Draw.  Procedure                               Abnormality         Status                     ---------                               -----------          ------                     Green Top (Gel)[[id number]]                                  Final result               Lavender Top[[id number]]                                     Final result               Gold Top - SST[[id number]]                                   Final result               Light Blue Top[[id number]]                                   Final result                 Please view results for these tests on the individual orders.   CBC AND DIFFERENTIAL    Narrative:     The following orders were created for panel order CBC & Differential.  Procedure                               Abnormality         Status                     ---------                               -----------         ------                     CBC Auto Differential[[id number]]        Abnormal            Final result                 Please view results for these tests on the individual orders.   GREEN TOP   LAVENDER TOP   GOLD TOP - SST   LIGHT BLUE TOP     XR Chest 1 View    Result Date: 8/24/2022  Narrative: EXAM:   XR Chest, 1 View CLINICAL HISTORY:   The patient is 52 years old and is Female; Chest Pain triage protocol Chest Pain Triage Protocol TECHNIQUE:   Frontal view of the chest. COMPARISON:   No relevant prior studies available. FINDINGS:   LUNGS:  Unremarkable.  No consolidation.   PLEURAL SPACE:  Unremarkable.  No pneumothorax.   HEART:  Unremarkable.  No cardiomegaly.   MEDIASTINUM:  Unremarkable.   BONES/JOINTS:  Unremarkable.   UPPER ABDOMEN:  Unremarkable as visualized.     Impression:   No acute cardiopulmonary process. Electronically signed by:  Tyra Ramos MD  8/24/2022 12:24 AM CDT Workstation: [id number]      MDM  Number of Diagnoses or Management Options     Amount and/or Complexity of Data Reviewed  Clinical lab tests: reviewed  Tests in the radiology section of CPT®: reviewed  Tests in the medicine section of CPT®: reviewed    Patient Progress  Patient progress: stable    Patient has complete resolution of symptoms  with GI cocktail.  Advised on course of Carafate and symptomatic treatment.  Advised on outpatient follow-up.  Cardiac evaluation unremarkable.  Agreeable to plan and discharge.    Final diagnoses:   Gastroesophageal reflux disease, unspecified whether esophagitis present   Acute gastritis, presence of bleeding unspecified, unspecified gastritis type         ED Disposition  ED Disposition     ED Disposition   Discharge    Condition   Stable    Comment   --             Javed Thomson MD  [facility] [address] [facility] [address] [phone]               Medication List      New Prescriptions    sucralfate 1 g tablet  Commonly known as: CARAFATE  Take 1 tablet by mouth 4 (Four) Times a Day for 7 days.           Where to Get Your Medications      These medications were sent to [facility], [facility] - [place], [place] - [address] - [phone]  - [phone] FX  [address] [place] [facility] [address]    Phone: [phone]   · sucralfate 1 g tablet          Sid Sutherland,   08/28/22 0332